# Patient Record
Sex: FEMALE | Race: BLACK OR AFRICAN AMERICAN | NOT HISPANIC OR LATINO | URBAN - METROPOLITAN AREA
[De-identification: names, ages, dates, MRNs, and addresses within clinical notes are randomized per-mention and may not be internally consistent; named-entity substitution may affect disease eponyms.]

---

## 2017-01-01 ENCOUNTER — INPATIENT (INPATIENT)
Facility: HOSPITAL | Age: 72
LOS: 8 days | End: 2017-05-12
Attending: INTERNAL MEDICINE | Admitting: INTERNAL MEDICINE
Payer: MEDICARE

## 2017-01-01 ENCOUNTER — INPATIENT (INPATIENT)
Facility: HOSPITAL | Age: 72
LOS: 8 days | Discharge: NURSING, REHAB OR LONG-TERM CARE | End: 2017-01-13
Attending: INTERNAL MEDICINE | Admitting: INTERNAL MEDICINE
Payer: MEDICARE

## 2017-01-01 VITALS
OXYGEN SATURATION: 99 % | DIASTOLIC BLOOD PRESSURE: 78 MMHG | SYSTOLIC BLOOD PRESSURE: 151 MMHG | RESPIRATION RATE: 16 BRPM | HEART RATE: 100 BPM | TEMPERATURE: 99 F

## 2017-01-01 VITALS
RESPIRATION RATE: 17 BRPM | TEMPERATURE: 99 F | DIASTOLIC BLOOD PRESSURE: 69 MMHG | OXYGEN SATURATION: 92 % | HEART RATE: 138 BPM | SYSTOLIC BLOOD PRESSURE: 129 MMHG

## 2017-01-01 VITALS
WEIGHT: 134.92 LBS | HEART RATE: 99 BPM | DIASTOLIC BLOOD PRESSURE: 60 MMHG | RESPIRATION RATE: 17 BRPM | TEMPERATURE: 98 F | OXYGEN SATURATION: 100 % | HEIGHT: 65 IN | SYSTOLIC BLOOD PRESSURE: 98 MMHG

## 2017-01-01 VITALS
WEIGHT: 110.01 LBS | HEIGHT: 65 IN | HEART RATE: 98 BPM | DIASTOLIC BLOOD PRESSURE: 72 MMHG | RESPIRATION RATE: 18 BRPM | SYSTOLIC BLOOD PRESSURE: 106 MMHG | OXYGEN SATURATION: 98 % | TEMPERATURE: 99 F

## 2017-01-01 DIAGNOSIS — K59.00 CONSTIPATION, UNSPECIFIED: ICD-10-CM

## 2017-01-01 DIAGNOSIS — Z98.890 OTHER SPECIFIED POSTPROCEDURAL STATES: Chronic | ICD-10-CM

## 2017-01-01 DIAGNOSIS — R13.13 DYSPHAGIA, PHARYNGEAL PHASE: ICD-10-CM

## 2017-01-01 DIAGNOSIS — L89.94 PRESSURE ULCER OF UNSPECIFIED SITE, STAGE 4: ICD-10-CM

## 2017-01-01 DIAGNOSIS — T14.8 OTHER INJURY OF UNSPECIFIED BODY REGION: ICD-10-CM

## 2017-01-01 DIAGNOSIS — L89.303 PRESSURE ULCER OF UNSPECIFIED BUTTOCK, STAGE 3: ICD-10-CM

## 2017-01-01 DIAGNOSIS — B20 HUMAN IMMUNODEFICIENCY VIRUS [HIV] DISEASE: ICD-10-CM

## 2017-01-01 DIAGNOSIS — K56.41 FECAL IMPACTION: ICD-10-CM

## 2017-01-01 DIAGNOSIS — R64 CACHEXIA: ICD-10-CM

## 2017-01-01 DIAGNOSIS — N13.30 UNSPECIFIED HYDRONEPHROSIS: ICD-10-CM

## 2017-01-01 DIAGNOSIS — Z98.890 OTHER SPECIFIED POSTPROCEDURAL STATES: ICD-10-CM

## 2017-01-01 DIAGNOSIS — L89.313 PRESSURE ULCER OF RIGHT BUTTOCK, STAGE 3: ICD-10-CM

## 2017-01-01 DIAGNOSIS — L89.321 PRESSURE ULCER OF LEFT BUTTOCK, STAGE 1: ICD-10-CM

## 2017-01-01 DIAGNOSIS — E87.0 HYPEROSMOLALITY AND HYPERNATREMIA: ICD-10-CM

## 2017-01-01 DIAGNOSIS — A41.9 SEPSIS, UNSPECIFIED ORGANISM: ICD-10-CM

## 2017-01-01 DIAGNOSIS — R82.90 UNSPECIFIED ABNORMAL FINDINGS IN URINE: ICD-10-CM

## 2017-01-01 DIAGNOSIS — L89.93 PRESSURE ULCER OF UNSPECIFIED SITE, STAGE 3: ICD-10-CM

## 2017-01-01 DIAGNOSIS — E87.6 HYPOKALEMIA: ICD-10-CM

## 2017-01-01 DIAGNOSIS — J15.6 PNEUMONIA DUE TO OTHER GRAM-NEGATIVE BACTERIA: ICD-10-CM

## 2017-01-01 DIAGNOSIS — R63.6 UNDERWEIGHT: ICD-10-CM

## 2017-01-01 DIAGNOSIS — N39.0 URINARY TRACT INFECTION, SITE NOT SPECIFIED: ICD-10-CM

## 2017-01-01 DIAGNOSIS — Z21 ASYMPTOMATIC HUMAN IMMUNODEFICIENCY VIRUS [HIV] INFECTION STATUS: ICD-10-CM

## 2017-01-01 DIAGNOSIS — L89.154 PRESSURE ULCER OF SACRAL REGION, STAGE 4: ICD-10-CM

## 2017-01-01 DIAGNOSIS — I10 ESSENTIAL (PRIMARY) HYPERTENSION: ICD-10-CM

## 2017-01-01 DIAGNOSIS — I63.9 CEREBRAL INFARCTION, UNSPECIFIED: ICD-10-CM

## 2017-01-01 DIAGNOSIS — E44.0 MODERATE PROTEIN-CALORIE MALNUTRITION: ICD-10-CM

## 2017-01-01 DIAGNOSIS — L89.300 PRESSURE ULCER OF UNSPECIFIED BUTTOCK, UNSTAGEABLE: ICD-10-CM

## 2017-01-01 DIAGNOSIS — Z93.0 TRACHEOSTOMY STATUS: ICD-10-CM

## 2017-01-01 DIAGNOSIS — E43 UNSPECIFIED SEVERE PROTEIN-CALORIE MALNUTRITION: ICD-10-CM

## 2017-01-01 LAB
4/8 RATIO: 4.42 RATIO — HIGH (ref 0.9–3.6)
ABS CD8: 226 /UL — SIGNIFICANT CHANGE UP (ref 136–757)
ALBUMIN SERPL ELPH-MCNC: 1.1 G/DL — LOW (ref 3.3–5)
ALBUMIN SERPL ELPH-MCNC: 1.2 G/DL — LOW (ref 3.3–5)
ALBUMIN SERPL ELPH-MCNC: 1.2 G/DL — LOW (ref 3.3–5)
ALBUMIN SERPL ELPH-MCNC: 1.4 G/DL — LOW (ref 3.3–5)
ALBUMIN SERPL ELPH-MCNC: 2.4 G/DL — LOW (ref 3.3–5)
ALP SERPL-CCNC: 111 U/L — SIGNIFICANT CHANGE UP (ref 40–120)
ALP SERPL-CCNC: 135 U/L — HIGH (ref 40–120)
ALP SERPL-CCNC: 159 U/L — HIGH (ref 40–120)
ALP SERPL-CCNC: 166 U/L — HIGH (ref 40–120)
ALP SERPL-CCNC: 197 U/L — HIGH (ref 40–120)
ALT FLD-CCNC: 10 U/L — LOW (ref 12–78)
ALT FLD-CCNC: 10 U/L — LOW (ref 12–78)
ALT FLD-CCNC: 12 U/L — SIGNIFICANT CHANGE UP (ref 12–78)
ALT FLD-CCNC: 12 U/L — SIGNIFICANT CHANGE UP (ref 12–78)
ALT FLD-CCNC: 14 U/L — SIGNIFICANT CHANGE UP (ref 12–78)
ANION GAP SERPL CALC-SCNC: 10 MMOL/L — SIGNIFICANT CHANGE UP (ref 5–17)
ANION GAP SERPL CALC-SCNC: 10 MMOL/L — SIGNIFICANT CHANGE UP (ref 5–17)
ANION GAP SERPL CALC-SCNC: 11 MMOL/L — SIGNIFICANT CHANGE UP (ref 5–17)
ANION GAP SERPL CALC-SCNC: 13 MMOL/L — SIGNIFICANT CHANGE UP (ref 5–17)
ANION GAP SERPL CALC-SCNC: 6 MMOL/L — SIGNIFICANT CHANGE UP (ref 5–17)
ANION GAP SERPL CALC-SCNC: 6 MMOL/L — SIGNIFICANT CHANGE UP (ref 5–17)
ANION GAP SERPL CALC-SCNC: 7 MMOL/L — SIGNIFICANT CHANGE UP (ref 5–17)
ANION GAP SERPL CALC-SCNC: 7 MMOL/L — SIGNIFICANT CHANGE UP (ref 5–17)
ANION GAP SERPL CALC-SCNC: 8 MMOL/L — SIGNIFICANT CHANGE UP (ref 5–17)
ANION GAP SERPL CALC-SCNC: 9 MMOL/L — SIGNIFICANT CHANGE UP (ref 5–17)
ANISOCYTOSIS BLD QL: SLIGHT — SIGNIFICANT CHANGE UP
ANISOCYTOSIS BLD QL: SLIGHT — SIGNIFICANT CHANGE UP
APPEARANCE UR: CLEAR — SIGNIFICANT CHANGE UP
APPEARANCE UR: CLEAR — SIGNIFICANT CHANGE UP
APTT BLD: 29 SEC — SIGNIFICANT CHANGE UP (ref 27.5–37.4)
APTT BLD: 35 SEC — SIGNIFICANT CHANGE UP (ref 27.5–37.4)
AST SERPL-CCNC: 15 U/L — SIGNIFICANT CHANGE UP (ref 15–37)
AST SERPL-CCNC: 16 U/L — SIGNIFICANT CHANGE UP (ref 15–37)
AST SERPL-CCNC: 19 U/L — SIGNIFICANT CHANGE UP (ref 15–37)
AST SERPL-CCNC: 23 U/L — SIGNIFICANT CHANGE UP (ref 15–37)
AST SERPL-CCNC: 26 U/L — SIGNIFICANT CHANGE UP (ref 15–37)
BACTERIA # UR AUTO: ABNORMAL
BACTERIA # UR AUTO: ABNORMAL
BASOPHILS # BLD AUTO: 0.1 K/UL — SIGNIFICANT CHANGE UP (ref 0–0.2)
BASOPHILS NFR BLD AUTO: 0.3 % — SIGNIFICANT CHANGE UP (ref 0–2)
BASOPHILS NFR BLD AUTO: 0.9 % — SIGNIFICANT CHANGE UP (ref 0–2)
BASOPHILS NFR BLD AUTO: 1 % — SIGNIFICANT CHANGE UP (ref 0–2)
BASOPHILS NFR BLD AUTO: 1 % — SIGNIFICANT CHANGE UP (ref 0–2)
BILIRUB SERPL-MCNC: 0.4 MG/DL — SIGNIFICANT CHANGE UP (ref 0.2–1.2)
BILIRUB SERPL-MCNC: 0.5 MG/DL — SIGNIFICANT CHANGE UP (ref 0.2–1.2)
BILIRUB SERPL-MCNC: 0.5 MG/DL — SIGNIFICANT CHANGE UP (ref 0.2–1.2)
BILIRUB UR-MCNC: NEGATIVE — SIGNIFICANT CHANGE UP
BILIRUB UR-MCNC: NEGATIVE — SIGNIFICANT CHANGE UP
BUN SERPL-MCNC: 10 MG/DL — SIGNIFICANT CHANGE UP (ref 7–23)
BUN SERPL-MCNC: 10 MG/DL — SIGNIFICANT CHANGE UP (ref 7–23)
BUN SERPL-MCNC: 11 MG/DL — SIGNIFICANT CHANGE UP (ref 7–23)
BUN SERPL-MCNC: 12 MG/DL — SIGNIFICANT CHANGE UP (ref 7–23)
BUN SERPL-MCNC: 12 MG/DL — SIGNIFICANT CHANGE UP (ref 7–23)
BUN SERPL-MCNC: 13 MG/DL — SIGNIFICANT CHANGE UP (ref 7–23)
BUN SERPL-MCNC: 15 MG/DL — SIGNIFICANT CHANGE UP (ref 7–23)
BUN SERPL-MCNC: 3 MG/DL — LOW (ref 7–23)
BUN SERPL-MCNC: 4 MG/DL — LOW (ref 7–23)
BUN SERPL-MCNC: 5 MG/DL — LOW (ref 7–23)
BUN SERPL-MCNC: 5 MG/DL — LOW (ref 7–23)
BUN SERPL-MCNC: 7 MG/DL — SIGNIFICANT CHANGE UP (ref 7–23)
BUN SERPL-MCNC: 9 MG/DL — SIGNIFICANT CHANGE UP (ref 7–23)
BURR CELLS BLD QL SMEAR: PRESENT — SIGNIFICANT CHANGE UP
CALCIUM SERPL-MCNC: 7.6 MG/DL — LOW (ref 8.5–10.1)
CALCIUM SERPL-MCNC: 7.6 MG/DL — LOW (ref 8.5–10.1)
CALCIUM SERPL-MCNC: 7.7 MG/DL — LOW (ref 8.5–10.1)
CALCIUM SERPL-MCNC: 7.7 MG/DL — LOW (ref 8.5–10.1)
CALCIUM SERPL-MCNC: 7.8 MG/DL — LOW (ref 8.5–10.1)
CALCIUM SERPL-MCNC: 7.9 MG/DL — LOW (ref 8.5–10.1)
CALCIUM SERPL-MCNC: 8.1 MG/DL — LOW (ref 8.5–10.1)
CALCIUM SERPL-MCNC: 8.3 MG/DL — LOW (ref 8.5–10.1)
CALCIUM SERPL-MCNC: 8.3 MG/DL — LOW (ref 8.5–10.1)
CALCIUM SERPL-MCNC: 8.4 MG/DL — LOW (ref 8.5–10.1)
CALCIUM SERPL-MCNC: 8.4 MG/DL — LOW (ref 8.5–10.1)
CALCIUM SERPL-MCNC: 8.6 MG/DL — SIGNIFICANT CHANGE UP (ref 8.5–10.1)
CALCIUM SERPL-MCNC: 8.7 MG/DL — SIGNIFICANT CHANGE UP (ref 8.5–10.1)
CALCIUM SERPL-MCNC: 8.8 MG/DL — SIGNIFICANT CHANGE UP (ref 8.5–10.1)
CALCIUM SERPL-MCNC: 8.8 MG/DL — SIGNIFICANT CHANGE UP (ref 8.5–10.1)
CALCIUM SERPL-MCNC: 9 MG/DL — SIGNIFICANT CHANGE UP (ref 8.5–10.1)
CALCIUM SERPL-MCNC: 9.4 MG/DL — SIGNIFICANT CHANGE UP (ref 8.5–10.1)
CD16+CD56+ CELLS NFR BLD: 20 % — SIGNIFICANT CHANGE UP (ref 4–25)
CD16+CD56+ CELLS NFR SPEC: 386 /UL — SIGNIFICANT CHANGE UP (ref 64–494)
CD19 BLASTS SPEC-ACNC: 15 % — SIGNIFICANT CHANGE UP (ref 5–22)
CD19 BLASTS SPEC-ACNC: 284 /UL — SIGNIFICANT CHANGE UP (ref 68–528)
CD3 BLASTS SPEC-ACNC: 1233 /UL — SIGNIFICANT CHANGE UP (ref 799–2171)
CD3 BLASTS SPEC-ACNC: 65 % — SIGNIFICANT CHANGE UP (ref 59–85)
CD4 %: 53 % — SIGNIFICANT CHANGE UP (ref 36–65)
CD8 %: 12 % — SIGNIFICANT CHANGE UP (ref 11–36)
CHLORIDE SERPL-SCNC: 100 MMOL/L — SIGNIFICANT CHANGE UP (ref 96–108)
CHLORIDE SERPL-SCNC: 101 MMOL/L — SIGNIFICANT CHANGE UP (ref 96–108)
CHLORIDE SERPL-SCNC: 103 MMOL/L — SIGNIFICANT CHANGE UP (ref 96–108)
CHLORIDE SERPL-SCNC: 104 MMOL/L — SIGNIFICANT CHANGE UP (ref 96–108)
CHLORIDE SERPL-SCNC: 106 MMOL/L — SIGNIFICANT CHANGE UP (ref 96–108)
CHLORIDE SERPL-SCNC: 107 MMOL/L — SIGNIFICANT CHANGE UP (ref 96–108)
CHLORIDE SERPL-SCNC: 107 MMOL/L — SIGNIFICANT CHANGE UP (ref 96–108)
CHLORIDE SERPL-SCNC: 108 MMOL/L — SIGNIFICANT CHANGE UP (ref 96–108)
CHLORIDE SERPL-SCNC: 109 MMOL/L — HIGH (ref 96–108)
CHLORIDE SERPL-SCNC: 110 MMOL/L — HIGH (ref 96–108)
CHLORIDE SERPL-SCNC: 112 MMOL/L — HIGH (ref 96–108)
CHLORIDE SERPL-SCNC: 113 MMOL/L — HIGH (ref 96–108)
CHLORIDE SERPL-SCNC: 113 MMOL/L — HIGH (ref 96–108)
CHLORIDE SERPL-SCNC: 117 MMOL/L — HIGH (ref 96–108)
CHLORIDE SERPL-SCNC: 119 MMOL/L — HIGH (ref 96–108)
CO2 SERPL-SCNC: 20 MMOL/L — LOW (ref 22–31)
CO2 SERPL-SCNC: 21 MMOL/L — LOW (ref 22–31)
CO2 SERPL-SCNC: 22 MMOL/L — SIGNIFICANT CHANGE UP (ref 22–31)
CO2 SERPL-SCNC: 24 MMOL/L — SIGNIFICANT CHANGE UP (ref 22–31)
CO2 SERPL-SCNC: 25 MMOL/L — SIGNIFICANT CHANGE UP (ref 22–31)
CO2 SERPL-SCNC: 26 MMOL/L — SIGNIFICANT CHANGE UP (ref 22–31)
CO2 SERPL-SCNC: 27 MMOL/L — SIGNIFICANT CHANGE UP (ref 22–31)
CO2 SERPL-SCNC: 27 MMOL/L — SIGNIFICANT CHANGE UP (ref 22–31)
CO2 SERPL-SCNC: 29 MMOL/L — SIGNIFICANT CHANGE UP (ref 22–31)
CO2 SERPL-SCNC: 30 MMOL/L — SIGNIFICANT CHANGE UP (ref 22–31)
CO2 SERPL-SCNC: 30 MMOL/L — SIGNIFICANT CHANGE UP (ref 22–31)
CO2 SERPL-SCNC: 31 MMOL/L — SIGNIFICANT CHANGE UP (ref 22–31)
CO2 SERPL-SCNC: 32 MMOL/L — HIGH (ref 22–31)
CO2 SERPL-SCNC: 33 MMOL/L — HIGH (ref 22–31)
CO2 SERPL-SCNC: 34 MMOL/L — HIGH (ref 22–31)
COLOR SPEC: YELLOW — SIGNIFICANT CHANGE UP
COLOR SPEC: YELLOW — SIGNIFICANT CHANGE UP
COMMENT - URINE: SIGNIFICANT CHANGE UP
CREAT SERPL-MCNC: 0.33 MG/DL — LOW (ref 0.5–1.3)
CREAT SERPL-MCNC: 0.4 MG/DL — LOW (ref 0.5–1.3)
CREAT SERPL-MCNC: 0.4 MG/DL — LOW (ref 0.5–1.3)
CREAT SERPL-MCNC: 0.43 MG/DL — LOW (ref 0.5–1.3)
CREAT SERPL-MCNC: 0.44 MG/DL — LOW (ref 0.5–1.3)
CREAT SERPL-MCNC: 0.45 MG/DL — LOW (ref 0.5–1.3)
CREAT SERPL-MCNC: 0.45 MG/DL — LOW (ref 0.5–1.3)
CREAT SERPL-MCNC: 0.46 MG/DL — LOW (ref 0.5–1.3)
CREAT SERPL-MCNC: 0.5 MG/DL — SIGNIFICANT CHANGE UP (ref 0.5–1.3)
CREAT SERPL-MCNC: 0.51 MG/DL — SIGNIFICANT CHANGE UP (ref 0.5–1.3)
CREAT SERPL-MCNC: 0.56 MG/DL — SIGNIFICANT CHANGE UP (ref 0.5–1.3)
CREAT SERPL-MCNC: 0.59 MG/DL — SIGNIFICANT CHANGE UP (ref 0.5–1.3)
CREAT SERPL-MCNC: 0.87 MG/DL — SIGNIFICANT CHANGE UP (ref 0.5–1.3)
CREAT SERPL-MCNC: 1.01 MG/DL — SIGNIFICANT CHANGE UP (ref 0.5–1.3)
CREAT SERPL-MCNC: 1.17 MG/DL — SIGNIFICANT CHANGE UP (ref 0.5–1.3)
CREAT SERPL-MCNC: 1.18 MG/DL — SIGNIFICANT CHANGE UP (ref 0.5–1.3)
CREAT SERPL-MCNC: 1.2 MG/DL — SIGNIFICANT CHANGE UP (ref 0.5–1.3)
CREAT SERPL-MCNC: 1.3 MG/DL — SIGNIFICANT CHANGE UP (ref 0.5–1.3)
CREAT SERPL-MCNC: 1.33 MG/DL — HIGH (ref 0.5–1.3)
CRP SERPL-MCNC: 9.76 MG/DL — HIGH (ref 0–0.4)
CULTURE RESULTS: NO GROWTH — SIGNIFICANT CHANGE UP
CULTURE RESULTS: SIGNIFICANT CHANGE UP
DIFF PNL FLD: ABNORMAL
DIFF PNL FLD: ABNORMAL
ELLIPTOCYTES BLD QL SMEAR: SLIGHT — SIGNIFICANT CHANGE UP
EOSINOPHIL # BLD AUTO: 0 K/UL — SIGNIFICANT CHANGE UP (ref 0–0.5)
EOSINOPHIL # BLD AUTO: 0 K/UL — SIGNIFICANT CHANGE UP (ref 0–0.5)
EOSINOPHIL # BLD AUTO: 0.1 K/UL — SIGNIFICANT CHANGE UP (ref 0–0.5)
EOSINOPHIL NFR BLD AUTO: 0 % — SIGNIFICANT CHANGE UP (ref 0–6)
EOSINOPHIL NFR BLD AUTO: 0.2 % — SIGNIFICANT CHANGE UP (ref 0–6)
EOSINOPHIL NFR BLD AUTO: 1 % — SIGNIFICANT CHANGE UP (ref 0–6)
EOSINOPHIL NFR BLD AUTO: 1.1 % — SIGNIFICANT CHANGE UP (ref 0–6)
EPI CELLS # UR: ABNORMAL
EPI CELLS # UR: SIGNIFICANT CHANGE UP
ERYTHROCYTE [SEDIMENTATION RATE] IN BLOOD: 44 MM/HR — HIGH (ref 0–20)
ERYTHROCYTE [SEDIMENTATION RATE] IN BLOOD: 50 MM/HR — HIGH (ref 0–20)
GLUCOSE SERPL-MCNC: 102 MG/DL — HIGH (ref 70–99)
GLUCOSE SERPL-MCNC: 104 MG/DL — HIGH (ref 70–99)
GLUCOSE SERPL-MCNC: 105 MG/DL — HIGH (ref 70–99)
GLUCOSE SERPL-MCNC: 105 MG/DL — HIGH (ref 70–99)
GLUCOSE SERPL-MCNC: 107 MG/DL — HIGH (ref 70–99)
GLUCOSE SERPL-MCNC: 110 MG/DL — HIGH (ref 70–99)
GLUCOSE SERPL-MCNC: 112 MG/DL — HIGH (ref 70–99)
GLUCOSE SERPL-MCNC: 117 MG/DL — HIGH (ref 70–99)
GLUCOSE SERPL-MCNC: 120 MG/DL — HIGH (ref 70–99)
GLUCOSE SERPL-MCNC: 124 MG/DL — HIGH (ref 70–99)
GLUCOSE SERPL-MCNC: 131 MG/DL — HIGH (ref 70–99)
GLUCOSE SERPL-MCNC: 142 MG/DL — HIGH (ref 70–99)
GLUCOSE SERPL-MCNC: 151 MG/DL — HIGH (ref 70–99)
GLUCOSE SERPL-MCNC: 78 MG/DL — SIGNIFICANT CHANGE UP (ref 70–99)
GLUCOSE SERPL-MCNC: 85 MG/DL — SIGNIFICANT CHANGE UP (ref 70–99)
GLUCOSE SERPL-MCNC: 92 MG/DL — SIGNIFICANT CHANGE UP (ref 70–99)
GLUCOSE SERPL-MCNC: 95 MG/DL — SIGNIFICANT CHANGE UP (ref 70–99)
GLUCOSE UR QL: NEGATIVE MG/DL — SIGNIFICANT CHANGE UP
GLUCOSE UR QL: NEGATIVE MG/DL — SIGNIFICANT CHANGE UP
HCT VFR BLD CALC: 24.8 % — LOW (ref 34.5–45)
HCT VFR BLD CALC: 25.8 % — LOW (ref 34.5–45)
HCT VFR BLD CALC: 26.8 % — LOW (ref 34.5–45)
HCT VFR BLD CALC: 27 % — LOW (ref 34.5–45)
HCT VFR BLD CALC: 27.2 % — LOW (ref 34.5–45)
HCT VFR BLD CALC: 27.4 % — LOW (ref 34.5–45)
HCT VFR BLD CALC: 28.1 % — LOW (ref 34.5–45)
HCT VFR BLD CALC: 28.2 % — LOW (ref 34.5–45)
HCT VFR BLD CALC: 28.6 % — LOW (ref 34.5–45)
HCT VFR BLD CALC: 29.2 % — LOW (ref 34.5–45)
HCT VFR BLD CALC: 29.9 % — LOW (ref 34.5–45)
HCT VFR BLD CALC: 31.4 % — LOW (ref 34.5–45)
HCT VFR BLD CALC: 32.3 % — LOW (ref 34.5–45)
HGB BLD-MCNC: 10 G/DL — LOW (ref 11.5–15.5)
HGB BLD-MCNC: 11.3 G/DL — LOW (ref 11.5–15.5)
HGB BLD-MCNC: 8.4 G/DL — LOW (ref 11.5–15.5)
HGB BLD-MCNC: 8.5 G/DL — LOW (ref 11.5–15.5)
HGB BLD-MCNC: 8.7 G/DL — LOW (ref 11.5–15.5)
HGB BLD-MCNC: 8.8 G/DL — LOW (ref 11.5–15.5)
HGB BLD-MCNC: 8.8 G/DL — LOW (ref 11.5–15.5)
HGB BLD-MCNC: 9.1 G/DL — LOW (ref 11.5–15.5)
HGB BLD-MCNC: 9.2 G/DL — LOW (ref 11.5–15.5)
HGB BLD-MCNC: 9.3 G/DL — LOW (ref 11.5–15.5)
HGB BLD-MCNC: 9.4 G/DL — LOW (ref 11.5–15.5)
HGB BLD-MCNC: 9.5 G/DL — LOW (ref 11.5–15.5)
HGB BLD-MCNC: 9.9 G/DL — LOW (ref 11.5–15.5)
HIV 1+2 AB+HIV1 P24 AG SERPL QL IA: SIGNIFICANT CHANGE UP
HIV1 RNA # SERPL NAA+PROBE: SIGNIFICANT CHANGE UP
HIV1 RNA SERPL NAA+PROBE-LOG#: SIGNIFICANT CHANGE UP LG10COP/ML
HYALINE CASTS # UR AUTO: ABNORMAL /LPF
HYPOCHROMIA BLD QL: SLIGHT — SIGNIFICANT CHANGE UP
HYPOCHROMIA BLD QL: SLIGHT — SIGNIFICANT CHANGE UP
INR BLD: 1.19 RATIO — HIGH (ref 0.88–1.16)
INR BLD: 1.23 RATIO — HIGH (ref 0.88–1.16)
KETONES UR-MCNC: ABNORMAL
KETONES UR-MCNC: ABNORMAL
LACTATE SERPL-SCNC: 1 MMOL/L — SIGNIFICANT CHANGE UP (ref 0.7–2)
LACTATE SERPL-SCNC: 1.7 MMOL/L — SIGNIFICANT CHANGE UP (ref 0.7–2)
LACTATE SERPL-SCNC: 2.1 MMOL/L — HIGH (ref 0.7–2)
LACTATE SERPL-SCNC: 2.2 MMOL/L — HIGH (ref 0.7–2)
LACTATE SERPL-SCNC: 2.8 MMOL/L — HIGH (ref 0.7–2)
LACTATE SERPL-SCNC: 3.2 MMOL/L — HIGH (ref 0.7–2)
LEUKOCYTE ESTERASE UR-ACNC: ABNORMAL
LEUKOCYTE ESTERASE UR-ACNC: ABNORMAL
LG PLATELETS BLD QL AUTO: SIGNIFICANT CHANGE UP
LIDOCAIN IGE QN: 70 U/L — LOW (ref 73–393)
LYMPHOCYTES # BLD AUTO: 0.8 K/UL — LOW (ref 1–3.3)
LYMPHOCYTES # BLD AUTO: 10 % — LOW (ref 13–44)
LYMPHOCYTES # BLD AUTO: 2.1 K/UL — SIGNIFICANT CHANGE UP (ref 1–3.3)
LYMPHOCYTES # BLD AUTO: 2.8 K/UL — SIGNIFICANT CHANGE UP (ref 1–3.3)
LYMPHOCYTES # BLD AUTO: 24.6 % — SIGNIFICANT CHANGE UP (ref 13–44)
LYMPHOCYTES # BLD AUTO: 28.9 % — SIGNIFICANT CHANGE UP (ref 13–44)
LYMPHOCYTES # BLD AUTO: 29 % — SIGNIFICANT CHANGE UP (ref 13–44)
LYMPHOCYTES # BLD AUTO: 4.4 % — LOW (ref 13–44)
MACROCYTES BLD QL: SLIGHT — SIGNIFICANT CHANGE UP
MAGNESIUM SERPL-MCNC: 1.8 MG/DL — SIGNIFICANT CHANGE UP (ref 1.8–2.4)
MAGNESIUM SERPL-MCNC: 1.9 MG/DL — SIGNIFICANT CHANGE UP (ref 1.8–2.4)
MAGNESIUM SERPL-MCNC: 2.1 MG/DL — SIGNIFICANT CHANGE UP (ref 1.8–2.4)
MCHC RBC-ENTMCNC: 27.4 PG — SIGNIFICANT CHANGE UP (ref 27–34)
MCHC RBC-ENTMCNC: 27.6 PG — SIGNIFICANT CHANGE UP (ref 27–34)
MCHC RBC-ENTMCNC: 28.2 PG — SIGNIFICANT CHANGE UP (ref 27–34)
MCHC RBC-ENTMCNC: 28.2 PG — SIGNIFICANT CHANGE UP (ref 27–34)
MCHC RBC-ENTMCNC: 28.6 PG — SIGNIFICANT CHANGE UP (ref 27–34)
MCHC RBC-ENTMCNC: 28.9 PG — SIGNIFICANT CHANGE UP (ref 27–34)
MCHC RBC-ENTMCNC: 28.9 PG — SIGNIFICANT CHANGE UP (ref 27–34)
MCHC RBC-ENTMCNC: 29 PG — SIGNIFICANT CHANGE UP (ref 27–34)
MCHC RBC-ENTMCNC: 29.1 PG — SIGNIFICANT CHANGE UP (ref 27–34)
MCHC RBC-ENTMCNC: 29.1 PG — SIGNIFICANT CHANGE UP (ref 27–34)
MCHC RBC-ENTMCNC: 29.5 PG — SIGNIFICANT CHANGE UP (ref 27–34)
MCHC RBC-ENTMCNC: 29.7 PG — SIGNIFICANT CHANGE UP (ref 27–34)
MCHC RBC-ENTMCNC: 30.1 PG — SIGNIFICANT CHANGE UP (ref 27–34)
MCHC RBC-ENTMCNC: 30.1 PG — SIGNIFICANT CHANGE UP (ref 27–34)
MCHC RBC-ENTMCNC: 30.7 PG — SIGNIFICANT CHANGE UP (ref 27–34)
MCHC RBC-ENTMCNC: 31.7 GM/DL — LOW (ref 32–36)
MCHC RBC-ENTMCNC: 32.1 GM/DL — SIGNIFICANT CHANGE UP (ref 32–36)
MCHC RBC-ENTMCNC: 32.6 GM/DL — SIGNIFICANT CHANGE UP (ref 32–36)
MCHC RBC-ENTMCNC: 32.7 GM/DL — SIGNIFICANT CHANGE UP (ref 32–36)
MCHC RBC-ENTMCNC: 33 GM/DL — SIGNIFICANT CHANGE UP (ref 32–36)
MCHC RBC-ENTMCNC: 33 GM/DL — SIGNIFICANT CHANGE UP (ref 32–36)
MCHC RBC-ENTMCNC: 33.6 GM/DL — SIGNIFICANT CHANGE UP (ref 32–36)
MCHC RBC-ENTMCNC: 33.6 GM/DL — SIGNIFICANT CHANGE UP (ref 32–36)
MCHC RBC-ENTMCNC: 33.7 GM/DL — SIGNIFICANT CHANGE UP (ref 32–36)
MCHC RBC-ENTMCNC: 33.7 GM/DL — SIGNIFICANT CHANGE UP (ref 32–36)
MCHC RBC-ENTMCNC: 34 GM/DL — SIGNIFICANT CHANGE UP (ref 32–36)
MCHC RBC-ENTMCNC: 34.1 GM/DL — SIGNIFICANT CHANGE UP (ref 32–36)
MCHC RBC-ENTMCNC: 34.2 GM/DL — SIGNIFICANT CHANGE UP (ref 32–36)
MCHC RBC-ENTMCNC: 34.6 GM/DL — SIGNIFICANT CHANGE UP (ref 32–36)
MCHC RBC-ENTMCNC: 35.1 GM/DL — SIGNIFICANT CHANGE UP (ref 32–36)
MCV RBC AUTO: 84.7 FL — SIGNIFICANT CHANGE UP (ref 80–100)
MCV RBC AUTO: 85.5 FL — SIGNIFICANT CHANGE UP (ref 80–100)
MCV RBC AUTO: 86.1 FL — SIGNIFICANT CHANGE UP (ref 80–100)
MCV RBC AUTO: 86.1 FL — SIGNIFICANT CHANGE UP (ref 80–100)
MCV RBC AUTO: 86.2 FL — SIGNIFICANT CHANGE UP (ref 80–100)
MCV RBC AUTO: 86.3 FL — SIGNIFICANT CHANGE UP (ref 80–100)
MCV RBC AUTO: 86.5 FL — SIGNIFICANT CHANGE UP (ref 80–100)
MCV RBC AUTO: 86.5 FL — SIGNIFICANT CHANGE UP (ref 80–100)
MCV RBC AUTO: 86.6 FL — SIGNIFICANT CHANGE UP (ref 80–100)
MCV RBC AUTO: 86.8 FL — SIGNIFICANT CHANGE UP (ref 80–100)
MCV RBC AUTO: 87 FL — SIGNIFICANT CHANGE UP (ref 80–100)
MCV RBC AUTO: 87.6 FL — SIGNIFICANT CHANGE UP (ref 80–100)
MCV RBC AUTO: 87.8 FL — SIGNIFICANT CHANGE UP (ref 80–100)
MCV RBC AUTO: 88.4 FL — SIGNIFICANT CHANGE UP (ref 80–100)
MCV RBC AUTO: 89.9 FL — SIGNIFICANT CHANGE UP (ref 80–100)
MICROCYTES BLD QL: SLIGHT — SIGNIFICANT CHANGE UP
MICROCYTES BLD QL: SLIGHT — SIGNIFICANT CHANGE UP
MONOCYTES # BLD AUTO: 0.5 K/UL — SIGNIFICANT CHANGE UP (ref 0–0.9)
MONOCYTES # BLD AUTO: 0.7 K/UL — SIGNIFICANT CHANGE UP (ref 0–0.9)
MONOCYTES # BLD AUTO: 0.9 K/UL — SIGNIFICANT CHANGE UP (ref 0–0.9)
MONOCYTES NFR BLD AUTO: 1 % — LOW (ref 2–14)
MONOCYTES NFR BLD AUTO: 4.9 % — SIGNIFICANT CHANGE UP (ref 2–14)
MONOCYTES NFR BLD AUTO: 5.8 % — SIGNIFICANT CHANGE UP (ref 2–14)
MONOCYTES NFR BLD AUTO: 7.1 % — SIGNIFICANT CHANGE UP (ref 2–14)
MONOCYTES NFR BLD AUTO: 8 % — SIGNIFICANT CHANGE UP (ref 2–14)
NEUTROPHILS # BLD AUTO: 16 K/UL — HIGH (ref 1.8–7.4)
NEUTROPHILS # BLD AUTO: 6 K/UL — SIGNIFICANT CHANGE UP (ref 1.8–7.4)
NEUTROPHILS # BLD AUTO: 6 K/UL — SIGNIFICANT CHANGE UP (ref 1.8–7.4)
NEUTROPHILS NFR BLD AUTO: 61 % — SIGNIFICANT CHANGE UP (ref 43–77)
NEUTROPHILS NFR BLD AUTO: 61.9 % — SIGNIFICANT CHANGE UP (ref 43–77)
NEUTROPHILS NFR BLD AUTO: 68.4 % — SIGNIFICANT CHANGE UP (ref 43–77)
NEUTROPHILS NFR BLD AUTO: 89 % — HIGH (ref 43–77)
NEUTROPHILS NFR BLD AUTO: 90.4 % — HIGH (ref 43–77)
NITRITE UR-MCNC: NEGATIVE — SIGNIFICANT CHANGE UP
NITRITE UR-MCNC: POSITIVE
PH UR: 5 — SIGNIFICANT CHANGE UP (ref 5–8)
PH UR: 6 — SIGNIFICANT CHANGE UP (ref 4.8–8)
PHOSPHATE SERPL-MCNC: 1.9 MG/DL — LOW (ref 2.5–4.5)
PHOSPHATE SERPL-MCNC: 2.8 MG/DL — SIGNIFICANT CHANGE UP (ref 2.5–4.5)
PLAT MORPH BLD: ABNORMAL
PLAT MORPH BLD: NORMAL — SIGNIFICANT CHANGE UP
PLATELET # BLD AUTO: 307 K/UL — SIGNIFICANT CHANGE UP (ref 150–400)
PLATELET # BLD AUTO: 316 K/UL — SIGNIFICANT CHANGE UP (ref 150–400)
PLATELET # BLD AUTO: 323 K/UL — SIGNIFICANT CHANGE UP (ref 150–400)
PLATELET # BLD AUTO: 324 K/UL — SIGNIFICANT CHANGE UP (ref 150–400)
PLATELET # BLD AUTO: 343 K/UL — SIGNIFICANT CHANGE UP (ref 150–400)
PLATELET # BLD AUTO: 359 K/UL — SIGNIFICANT CHANGE UP (ref 150–400)
PLATELET # BLD AUTO: 392 K/UL — SIGNIFICANT CHANGE UP (ref 150–400)
PLATELET # BLD AUTO: 414 K/UL — HIGH (ref 150–400)
PLATELET # BLD AUTO: 439 K/UL — HIGH (ref 150–400)
PLATELET # BLD AUTO: 458 K/UL — HIGH (ref 150–400)
PLATELET # BLD AUTO: 460 K/UL — HIGH (ref 150–400)
PLATELET # BLD AUTO: 483 K/UL — HIGH (ref 150–400)
PLATELET # BLD AUTO: 510 K/UL — HIGH (ref 150–400)
PLATELET # BLD AUTO: 513 K/UL — HIGH (ref 150–400)
PLATELET # BLD AUTO: 513 K/UL — HIGH (ref 150–400)
POTASSIUM SERPL-MCNC: 2.5 MMOL/L — CRITICAL LOW (ref 3.5–5.3)
POTASSIUM SERPL-MCNC: 2.6 MMOL/L — CRITICAL LOW (ref 3.5–5.3)
POTASSIUM SERPL-MCNC: 2.9 MMOL/L — CRITICAL LOW (ref 3.5–5.3)
POTASSIUM SERPL-MCNC: 3 MMOL/L — LOW (ref 3.5–5.3)
POTASSIUM SERPL-MCNC: 3 MMOL/L — LOW (ref 3.5–5.3)
POTASSIUM SERPL-MCNC: 3.1 MMOL/L — LOW (ref 3.5–5.3)
POTASSIUM SERPL-MCNC: 3.3 MMOL/L — LOW (ref 3.5–5.3)
POTASSIUM SERPL-MCNC: 3.4 MMOL/L — LOW (ref 3.5–5.3)
POTASSIUM SERPL-MCNC: 3.6 MMOL/L — SIGNIFICANT CHANGE UP (ref 3.5–5.3)
POTASSIUM SERPL-MCNC: 3.6 MMOL/L — SIGNIFICANT CHANGE UP (ref 3.5–5.3)
POTASSIUM SERPL-MCNC: 3.7 MMOL/L — SIGNIFICANT CHANGE UP (ref 3.5–5.3)
POTASSIUM SERPL-MCNC: 3.8 MMOL/L — SIGNIFICANT CHANGE UP (ref 3.5–5.3)
POTASSIUM SERPL-MCNC: 3.9 MMOL/L — SIGNIFICANT CHANGE UP (ref 3.5–5.3)
POTASSIUM SERPL-MCNC: 3.9 MMOL/L — SIGNIFICANT CHANGE UP (ref 3.5–5.3)
POTASSIUM SERPL-MCNC: 4 MMOL/L — SIGNIFICANT CHANGE UP (ref 3.5–5.3)
POTASSIUM SERPL-SCNC: 2.5 MMOL/L — CRITICAL LOW (ref 3.5–5.3)
POTASSIUM SERPL-SCNC: 2.6 MMOL/L — CRITICAL LOW (ref 3.5–5.3)
POTASSIUM SERPL-SCNC: 2.9 MMOL/L — CRITICAL LOW (ref 3.5–5.3)
POTASSIUM SERPL-SCNC: 3 MMOL/L — LOW (ref 3.5–5.3)
POTASSIUM SERPL-SCNC: 3 MMOL/L — LOW (ref 3.5–5.3)
POTASSIUM SERPL-SCNC: 3.1 MMOL/L — LOW (ref 3.5–5.3)
POTASSIUM SERPL-SCNC: 3.3 MMOL/L — LOW (ref 3.5–5.3)
POTASSIUM SERPL-SCNC: 3.4 MMOL/L — LOW (ref 3.5–5.3)
POTASSIUM SERPL-SCNC: 3.6 MMOL/L — SIGNIFICANT CHANGE UP (ref 3.5–5.3)
POTASSIUM SERPL-SCNC: 3.6 MMOL/L — SIGNIFICANT CHANGE UP (ref 3.5–5.3)
POTASSIUM SERPL-SCNC: 3.7 MMOL/L — SIGNIFICANT CHANGE UP (ref 3.5–5.3)
POTASSIUM SERPL-SCNC: 3.8 MMOL/L — SIGNIFICANT CHANGE UP (ref 3.5–5.3)
POTASSIUM SERPL-SCNC: 3.9 MMOL/L — SIGNIFICANT CHANGE UP (ref 3.5–5.3)
POTASSIUM SERPL-SCNC: 3.9 MMOL/L — SIGNIFICANT CHANGE UP (ref 3.5–5.3)
POTASSIUM SERPL-SCNC: 4 MMOL/L — SIGNIFICANT CHANGE UP (ref 3.5–5.3)
PROCALCITONIN SERPL-MCNC: 0.96 NG/ML — HIGH (ref 0–0.05)
PROCALCITONIN SERPL-MCNC: 34.27 NG/ML — HIGH (ref 0–0.05)
PROT SERPL-MCNC: 5.8 GM/DL — LOW (ref 6–8.3)
PROT SERPL-MCNC: 5.8 GM/DL — LOW (ref 6–8.3)
PROT SERPL-MCNC: 5.9 GM/DL — LOW (ref 6–8.3)
PROT SERPL-MCNC: 6.8 GM/DL — SIGNIFICANT CHANGE UP (ref 6–8.3)
PROT SERPL-MCNC: 8 GM/DL — SIGNIFICANT CHANGE UP (ref 6–8.3)
PROT UR-MCNC: 100 MG/DL
PROT UR-MCNC: 30 MG/DL
PROTHROM AB SERPL-ACNC: 13 SEC — HIGH (ref 9.8–12.7)
PROTHROM AB SERPL-ACNC: 13.8 SEC — HIGH (ref 10–13.1)
RBC # BLD: 2.89 M/UL — LOW (ref 3.8–5.2)
RBC # BLD: 2.97 M/UL — LOW (ref 3.8–5.2)
RBC # BLD: 3.1 M/UL — LOW (ref 3.8–5.2)
RBC # BLD: 3.12 M/UL — LOW (ref 3.8–5.2)
RBC # BLD: 3.16 M/UL — LOW (ref 3.8–5.2)
RBC # BLD: 3.16 M/UL — LOW (ref 3.8–5.2)
RBC # BLD: 3.18 M/UL — LOW (ref 3.8–5.2)
RBC # BLD: 3.2 M/UL — LOW (ref 3.8–5.2)
RBC # BLD: 3.21 M/UL — LOW (ref 3.8–5.2)
RBC # BLD: 3.28 M/UL — LOW (ref 3.8–5.2)
RBC # BLD: 3.29 M/UL — LOW (ref 3.8–5.2)
RBC # BLD: 3.31 M/UL — LOW (ref 3.8–5.2)
RBC # BLD: 3.32 M/UL — LOW (ref 3.8–5.2)
RBC # BLD: 3.62 M/UL — LOW (ref 3.8–5.2)
RBC # BLD: 3.69 M/UL — LOW (ref 3.8–5.2)
RBC # FLD: 12 % — SIGNIFICANT CHANGE UP (ref 11–15)
RBC # FLD: 12 % — SIGNIFICANT CHANGE UP (ref 11–15)
RBC # FLD: 12.1 % — SIGNIFICANT CHANGE UP (ref 11–15)
RBC # FLD: 12.7 % — SIGNIFICANT CHANGE UP (ref 11–15)
RBC # FLD: 12.9 % — SIGNIFICANT CHANGE UP (ref 11–15)
RBC # FLD: 12.9 % — SIGNIFICANT CHANGE UP (ref 11–15)
RBC # FLD: 13.3 % — SIGNIFICANT CHANGE UP (ref 11–15)
RBC # FLD: 13.4 % — SIGNIFICANT CHANGE UP (ref 11–15)
RBC # FLD: 13.4 % — SIGNIFICANT CHANGE UP (ref 11–15)
RBC # FLD: 13.9 % — SIGNIFICANT CHANGE UP (ref 11–15)
RBC # FLD: 13.9 % — SIGNIFICANT CHANGE UP (ref 11–15)
RBC # FLD: 14 % — SIGNIFICANT CHANGE UP (ref 11–15)
RBC # FLD: 14.2 % — SIGNIFICANT CHANGE UP (ref 11–15)
RBC # FLD: 14.3 % — SIGNIFICANT CHANGE UP (ref 11–15)
RBC # FLD: 14.5 % — SIGNIFICANT CHANGE UP (ref 11–15)
RBC BLD AUTO: ABNORMAL
RBC BLD AUTO: SIGNIFICANT CHANGE UP
RBC CASTS # UR COMP ASSIST: ABNORMAL /HPF (ref 0–4)
SCHISTOCYTES BLD QL AUTO: SLIGHT — SIGNIFICANT CHANGE UP
SODIUM SERPL-SCNC: 138 MMOL/L — SIGNIFICANT CHANGE UP (ref 135–145)
SODIUM SERPL-SCNC: 139 MMOL/L — SIGNIFICANT CHANGE UP (ref 135–145)
SODIUM SERPL-SCNC: 139 MMOL/L — SIGNIFICANT CHANGE UP (ref 135–145)
SODIUM SERPL-SCNC: 140 MMOL/L — SIGNIFICANT CHANGE UP (ref 135–145)
SODIUM SERPL-SCNC: 144 MMOL/L — SIGNIFICANT CHANGE UP (ref 135–145)
SODIUM SERPL-SCNC: 145 MMOL/L — SIGNIFICANT CHANGE UP (ref 135–145)
SODIUM SERPL-SCNC: 146 MMOL/L — HIGH (ref 135–145)
SODIUM SERPL-SCNC: 147 MMOL/L — HIGH (ref 135–145)
SODIUM SERPL-SCNC: 147 MMOL/L — HIGH (ref 135–145)
SODIUM SERPL-SCNC: 148 MMOL/L — HIGH (ref 135–145)
SODIUM SERPL-SCNC: 149 MMOL/L — HIGH (ref 135–145)
SODIUM SERPL-SCNC: 150 MMOL/L — HIGH (ref 135–145)
SODIUM SERPL-SCNC: 150 MMOL/L — HIGH (ref 135–145)
SP GR SPEC: 1.02 — SIGNIFICANT CHANGE UP (ref 1.01–1.02)
SP GR SPEC: 1.02 — SIGNIFICANT CHANGE UP (ref 1.01–1.02)
SPECIMEN SOURCE: SIGNIFICANT CHANGE UP
STOMATOCYTES BLD QL SMEAR: PRESENT — SIGNIFICANT CHANGE UP
T-CELL CD4 SUBSET PNL BLD: 999 /UL — SIGNIFICANT CHANGE UP (ref 489–1457)
UROBILINOGEN FLD QL: 1 MG/DL
UROBILINOGEN FLD QL: 4 MG/DL
VANCOMYCIN TROUGH SERPL-MCNC: 16.4 UG/ML — SIGNIFICANT CHANGE UP (ref 10–20)
VANCOMYCIN TROUGH SERPL-MCNC: 16.7 UG/ML — SIGNIFICANT CHANGE UP (ref 10–20)
VANCOMYCIN TROUGH SERPL-MCNC: 35 UG/ML — CRITICAL HIGH (ref 10–20)
VANCOMYCIN TROUGH SERPL-MCNC: 38.9 UG/ML — CRITICAL HIGH (ref 10–20)
WBC # BLD: 10.3 K/UL — SIGNIFICANT CHANGE UP (ref 3.8–10.5)
WBC # BLD: 10.4 K/UL — SIGNIFICANT CHANGE UP (ref 3.8–10.5)
WBC # BLD: 12.3 K/UL — HIGH (ref 3.8–10.5)
WBC # BLD: 13.1 K/UL — HIGH (ref 3.8–10.5)
WBC # BLD: 13.7 K/UL — HIGH (ref 3.8–10.5)
WBC # BLD: 17.7 K/UL — HIGH (ref 3.8–10.5)
WBC # BLD: 20.3 K/UL — HIGH (ref 3.8–10.5)
WBC # BLD: 24.9 K/UL — HIGH (ref 3.8–10.5)
WBC # BLD: 8.7 K/UL — SIGNIFICANT CHANGE UP (ref 3.8–10.5)
WBC # BLD: 8.9 K/UL — SIGNIFICANT CHANGE UP (ref 3.8–10.5)
WBC # BLD: 9.6 K/UL — SIGNIFICANT CHANGE UP (ref 3.8–10.5)
WBC # BLD: 9.6 K/UL — SIGNIFICANT CHANGE UP (ref 3.8–10.5)
WBC # BLD: 9.8 K/UL — SIGNIFICANT CHANGE UP (ref 3.8–10.5)
WBC # FLD AUTO: 10.3 K/UL — SIGNIFICANT CHANGE UP (ref 3.8–10.5)
WBC # FLD AUTO: 10.4 K/UL — SIGNIFICANT CHANGE UP (ref 3.8–10.5)
WBC # FLD AUTO: 12.3 K/UL — HIGH (ref 3.8–10.5)
WBC # FLD AUTO: 13.1 K/UL — HIGH (ref 3.8–10.5)
WBC # FLD AUTO: 13.7 K/UL — HIGH (ref 3.8–10.5)
WBC # FLD AUTO: 17.7 K/UL — HIGH (ref 3.8–10.5)
WBC # FLD AUTO: 20.3 K/UL — HIGH (ref 3.8–10.5)
WBC # FLD AUTO: 24.9 K/UL — HIGH (ref 3.8–10.5)
WBC # FLD AUTO: 8.7 K/UL — SIGNIFICANT CHANGE UP (ref 3.8–10.5)
WBC # FLD AUTO: 8.9 K/UL — SIGNIFICANT CHANGE UP (ref 3.8–10.5)
WBC # FLD AUTO: 9.6 K/UL — SIGNIFICANT CHANGE UP (ref 3.8–10.5)
WBC # FLD AUTO: 9.6 K/UL — SIGNIFICANT CHANGE UP (ref 3.8–10.5)
WBC # FLD AUTO: 9.8 K/UL — SIGNIFICANT CHANGE UP (ref 3.8–10.5)
WBC UR QL: ABNORMAL
WBC UR QL: SIGNIFICANT CHANGE UP

## 2017-01-01 PROCEDURE — 74000: CPT | Mod: 26

## 2017-01-01 PROCEDURE — 71010: CPT | Mod: 26

## 2017-01-01 PROCEDURE — 99497 ADVNCD CARE PLAN 30 MIN: CPT

## 2017-01-01 PROCEDURE — 99233 SBSQ HOSP IP/OBS HIGH 50: CPT

## 2017-01-01 PROCEDURE — 99239 HOSP IP/OBS DSCHRG MGMT >30: CPT

## 2017-01-01 PROCEDURE — 72220 X-RAY EXAM SACRUM TAILBONE: CPT | Mod: 26

## 2017-01-01 PROCEDURE — 99285 EMERGENCY DEPT VISIT HI MDM: CPT

## 2017-01-01 PROCEDURE — 99231 SBSQ HOSP IP/OBS SF/LOW 25: CPT

## 2017-01-01 PROCEDURE — 74177 CT ABD & PELVIS W/CONTRAST: CPT | Mod: 26

## 2017-01-01 PROCEDURE — 99291 CRITICAL CARE FIRST HOUR: CPT

## 2017-01-01 PROCEDURE — 93970 EXTREMITY STUDY: CPT | Mod: 26

## 2017-01-01 PROCEDURE — 99223 1ST HOSP IP/OBS HIGH 75: CPT

## 2017-01-01 RX ORDER — SENNA PLUS 8.6 MG/1
2 TABLET ORAL
Qty: 0 | Refills: 0 | COMMUNITY
Start: 2017-01-01

## 2017-01-01 RX ORDER — SODIUM CHLORIDE 9 MG/ML
1000 INJECTION, SOLUTION INTRAVENOUS
Qty: 0 | Refills: 0 | Status: DISCONTINUED | OUTPATIENT
Start: 2017-01-01 | End: 2017-01-01

## 2017-01-01 RX ORDER — ACETAMINOPHEN 500 MG
650 TABLET ORAL ONCE
Qty: 0 | Refills: 0 | Status: COMPLETED | OUTPATIENT
Start: 2017-01-01 | End: 2017-01-01

## 2017-01-01 RX ORDER — CEFTRIAXONE 500 MG/1
INJECTION, POWDER, FOR SOLUTION INTRAMUSCULAR; INTRAVENOUS
Qty: 0 | Refills: 0 | Status: DISCONTINUED | OUTPATIENT
Start: 2017-01-01 | End: 2017-01-01

## 2017-01-01 RX ORDER — SOD SULF/SODIUM/NAHCO3/KCL/PEG
2000 SOLUTION, RECONSTITUTED, ORAL ORAL ONCE
Qty: 0 | Refills: 0 | Status: COMPLETED | OUTPATIENT
Start: 2017-01-01 | End: 2017-01-01

## 2017-01-01 RX ORDER — POTASSIUM CHLORIDE 20 MEQ
10 PACKET (EA) ORAL
Qty: 0 | Refills: 0 | Status: COMPLETED | OUTPATIENT
Start: 2017-01-01 | End: 2017-01-01

## 2017-01-01 RX ORDER — PSYLLIUM SEED (WITH DEXTROSE)
1 POWDER (GRAM) ORAL
Qty: 0 | Refills: 0 | COMMUNITY

## 2017-01-01 RX ORDER — MORPHINE SULFATE 50 MG/1
2 CAPSULE, EXTENDED RELEASE ORAL ONCE
Qty: 0 | Refills: 0 | Status: DISCONTINUED | OUTPATIENT
Start: 2017-01-01 | End: 2017-01-01

## 2017-01-01 RX ORDER — MEROPENEM 1 G/30ML
500 INJECTION INTRAVENOUS ONCE
Qty: 0 | Refills: 0 | Status: COMPLETED | OUTPATIENT
Start: 2017-01-01 | End: 2017-01-01

## 2017-01-01 RX ORDER — SENNA PLUS 8.6 MG/1
2 TABLET ORAL
Qty: 60 | Refills: 1 | OUTPATIENT
Start: 2017-01-01 | End: 2017-01-01

## 2017-01-01 RX ORDER — SODIUM CHLORIDE 9 MG/ML
3 INJECTION INTRAMUSCULAR; INTRAVENOUS; SUBCUTANEOUS ONCE
Qty: 0 | Refills: 0 | Status: COMPLETED | OUTPATIENT
Start: 2017-01-01 | End: 2017-01-01

## 2017-01-01 RX ORDER — METOPROLOL TARTRATE 50 MG
1 TABLET ORAL
Qty: 0 | Refills: 0 | COMMUNITY

## 2017-01-01 RX ORDER — HEPARIN SODIUM 5000 [USP'U]/ML
5000 INJECTION INTRAVENOUS; SUBCUTANEOUS EVERY 12 HOURS
Qty: 0 | Refills: 0 | Status: DISCONTINUED | OUTPATIENT
Start: 2017-01-01 | End: 2017-01-01

## 2017-01-01 RX ORDER — MORPHINE SULFATE 50 MG/1
2 CAPSULE, EXTENDED RELEASE ORAL EVERY 4 HOURS
Qty: 0 | Refills: 0 | Status: DISCONTINUED | OUTPATIENT
Start: 2017-01-01 | End: 2017-01-01

## 2017-01-01 RX ORDER — GENTAMICIN SULFATE 40 MG/ML
120 VIAL (ML) INJECTION ONCE
Qty: 0 | Refills: 0 | Status: COMPLETED | OUTPATIENT
Start: 2017-01-01 | End: 2017-01-01

## 2017-01-01 RX ORDER — FUROSEMIDE 40 MG
20 TABLET ORAL ONCE
Qty: 0 | Refills: 0 | Status: COMPLETED | OUTPATIENT
Start: 2017-01-01 | End: 2017-01-01

## 2017-01-01 RX ORDER — PSYLLIUM SEED (WITH DEXTROSE)
1 POWDER (GRAM) ORAL DAILY
Qty: 0 | Refills: 0 | Status: DISCONTINUED | OUTPATIENT
Start: 2017-01-01 | End: 2017-01-01

## 2017-01-01 RX ORDER — POTASSIUM CHLORIDE 20 MEQ
40 PACKET (EA) ORAL ONCE
Qty: 0 | Refills: 0 | Status: COMPLETED | OUTPATIENT
Start: 2017-01-01 | End: 2017-01-01

## 2017-01-01 RX ORDER — PANTOPRAZOLE SODIUM 20 MG/1
40 TABLET, DELAYED RELEASE ORAL DAILY
Qty: 0 | Refills: 0 | Status: DISCONTINUED | OUTPATIENT
Start: 2017-01-01 | End: 2017-01-01

## 2017-01-01 RX ORDER — CEFTRIAXONE 500 MG/1
1 INJECTION, POWDER, FOR SOLUTION INTRAMUSCULAR; INTRAVENOUS ONCE
Qty: 0 | Refills: 0 | Status: COMPLETED | OUTPATIENT
Start: 2017-01-01 | End: 2017-01-01

## 2017-01-01 RX ORDER — POTASSIUM CHLORIDE 20 MEQ
10 PACKET (EA) ORAL ONCE
Qty: 0 | Refills: 0 | Status: COMPLETED | OUTPATIENT
Start: 2017-01-01 | End: 2017-01-01

## 2017-01-01 RX ORDER — COLLAGENASE CLOSTRIDIUM HIST. 250 UNIT/G
1 OINTMENT (GRAM) TOPICAL
Qty: 0 | Refills: 0 | Status: DISCONTINUED | OUTPATIENT
Start: 2017-01-01 | End: 2017-01-01

## 2017-01-01 RX ORDER — ACETAMINOPHEN 500 MG
650 TABLET ORAL EVERY 6 HOURS
Qty: 0 | Refills: 0 | Status: DISCONTINUED | OUTPATIENT
Start: 2017-01-01 | End: 2017-01-01

## 2017-01-01 RX ORDER — CEFEPIME 1 G/1
1000 INJECTION, POWDER, FOR SOLUTION INTRAMUSCULAR; INTRAVENOUS ONCE
Qty: 0 | Refills: 0 | Status: COMPLETED | OUTPATIENT
Start: 2017-01-01 | End: 2017-01-01

## 2017-01-01 RX ORDER — PIPERACILLIN AND TAZOBACTAM 4; .5 G/20ML; G/20ML
3.38 INJECTION, POWDER, LYOPHILIZED, FOR SOLUTION INTRAVENOUS ONCE
Qty: 0 | Refills: 0 | Status: COMPLETED | OUTPATIENT
Start: 2017-01-01 | End: 2017-01-01

## 2017-01-01 RX ORDER — SODIUM CHLORIDE 9 MG/ML
500 INJECTION INTRAMUSCULAR; INTRAVENOUS; SUBCUTANEOUS ONCE
Qty: 0 | Refills: 0 | Status: COMPLETED | OUTPATIENT
Start: 2017-01-01 | End: 2017-01-01

## 2017-01-01 RX ORDER — POTASSIUM CHLORIDE 20 MEQ
20 PACKET (EA) ORAL ONCE
Qty: 0 | Refills: 0 | Status: COMPLETED | OUTPATIENT
Start: 2017-01-01 | End: 2017-01-01

## 2017-01-01 RX ORDER — PSYLLIUM SEED (WITH DEXTROSE)
1 POWDER (GRAM) ORAL
Qty: 30 | Refills: 1 | OUTPATIENT
Start: 2017-01-01 | End: 2017-01-01

## 2017-01-01 RX ORDER — IPRATROPIUM/ALBUTEROL SULFATE 18-103MCG
3 AEROSOL WITH ADAPTER (GRAM) INHALATION EVERY 6 HOURS
Qty: 0 | Refills: 0 | Status: DISCONTINUED | OUTPATIENT
Start: 2017-01-01 | End: 2017-01-01

## 2017-01-01 RX ORDER — POTASSIUM PHOSPHATE, MONOBASIC POTASSIUM PHOSPHATE, DIBASIC 236; 224 MG/ML; MG/ML
15 INJECTION, SOLUTION INTRAVENOUS ONCE
Qty: 0 | Refills: 0 | Status: COMPLETED | OUTPATIENT
Start: 2017-01-01 | End: 2017-01-01

## 2017-01-01 RX ORDER — VANCOMYCIN HCL 1 G
1000 VIAL (EA) INTRAVENOUS EVERY 12 HOURS
Qty: 0 | Refills: 0 | Status: DISCONTINUED | OUTPATIENT
Start: 2017-01-01 | End: 2017-01-01

## 2017-01-01 RX ORDER — POLYETHYLENE GLYCOL 3350 17 G/17G
17 POWDER, FOR SOLUTION ORAL
Qty: 0 | Refills: 0 | COMMUNITY
Start: 2017-01-01

## 2017-01-01 RX ORDER — POLYETHYLENE GLYCOL 3350 17 G/17G
17 POWDER, FOR SOLUTION ORAL DAILY
Qty: 0 | Refills: 0 | Status: DISCONTINUED | OUTPATIENT
Start: 2017-01-01 | End: 2017-01-01

## 2017-01-01 RX ORDER — OXYCODONE HYDROCHLORIDE 5 MG/1
10 TABLET ORAL EVERY 12 HOURS
Qty: 0 | Refills: 0 | Status: DISCONTINUED | OUTPATIENT
Start: 2017-01-01 | End: 2017-01-01

## 2017-01-01 RX ORDER — VANCOMYCIN HCL 1 G
1000 VIAL (EA) INTRAVENOUS ONCE
Qty: 0 | Refills: 0 | Status: COMPLETED | OUTPATIENT
Start: 2017-01-01 | End: 2017-01-01

## 2017-01-01 RX ORDER — DOCUSATE SODIUM 100 MG
100 CAPSULE ORAL THREE TIMES A DAY
Qty: 0 | Refills: 0 | Status: DISCONTINUED | OUTPATIENT
Start: 2017-01-01 | End: 2017-01-01

## 2017-01-01 RX ORDER — BACLOFEN 100 %
20 POWDER (GRAM) MISCELLANEOUS
Qty: 0 | Refills: 0 | Status: DISCONTINUED | OUTPATIENT
Start: 2017-01-01 | End: 2017-01-01

## 2017-01-01 RX ORDER — MEROPENEM 1 G/30ML
500 INJECTION INTRAVENOUS EVERY 12 HOURS
Qty: 0 | Refills: 0 | Status: DISCONTINUED | OUTPATIENT
Start: 2017-01-01 | End: 2017-01-01

## 2017-01-01 RX ORDER — SODIUM CHLORIDE 9 MG/ML
1000 INJECTION INTRAMUSCULAR; INTRAVENOUS; SUBCUTANEOUS ONCE
Qty: 0 | Refills: 0 | Status: COMPLETED | OUTPATIENT
Start: 2017-01-01 | End: 2017-01-01

## 2017-01-01 RX ORDER — POLYETHYLENE GLYCOL 3350 17 G/17G
17 POWDER, FOR SOLUTION ORAL
Qty: 510 | Refills: 1 | OUTPATIENT
Start: 2017-01-01 | End: 2017-01-01

## 2017-01-01 RX ORDER — MEROPENEM 1 G/30ML
INJECTION INTRAVENOUS
Qty: 0 | Refills: 0 | Status: DISCONTINUED | OUTPATIENT
Start: 2017-01-01 | End: 2017-01-01

## 2017-01-01 RX ORDER — SODIUM CHLORIDE 9 MG/ML
1000 INJECTION INTRAMUSCULAR; INTRAVENOUS; SUBCUTANEOUS
Qty: 0 | Refills: 0 | Status: DISCONTINUED | OUTPATIENT
Start: 2017-01-01 | End: 2017-01-01

## 2017-01-01 RX ORDER — METOPROLOL TARTRATE 50 MG
50 TABLET ORAL
Qty: 0 | Refills: 0 | Status: DISCONTINUED | OUTPATIENT
Start: 2017-01-01 | End: 2017-01-01

## 2017-01-01 RX ORDER — OXYCODONE HYDROCHLORIDE 5 MG/1
10 TABLET ORAL EVERY 8 HOURS
Qty: 0 | Refills: 0 | Status: DISCONTINUED | OUTPATIENT
Start: 2017-01-01 | End: 2017-01-01

## 2017-01-01 RX ORDER — POTASSIUM CHLORIDE 20 MEQ
10 PACKET (EA) ORAL
Qty: 0 | Refills: 0 | Status: DISCONTINUED | OUTPATIENT
Start: 2017-01-01 | End: 2017-01-01

## 2017-01-01 RX ORDER — IOHEXOL 300 MG/ML
30 INJECTION, SOLUTION INTRAVENOUS ONCE
Qty: 0 | Refills: 0 | Status: COMPLETED | OUTPATIENT
Start: 2017-01-01 | End: 2017-01-01

## 2017-01-01 RX ORDER — BACLOFEN 100 %
20 POWDER (GRAM) MISCELLANEOUS
Qty: 0 | Refills: 0 | COMMUNITY

## 2017-01-01 RX ORDER — KETOROLAC TROMETHAMINE 30 MG/ML
15 SYRINGE (ML) INJECTION ONCE
Qty: 0 | Refills: 0 | Status: DISCONTINUED | OUTPATIENT
Start: 2017-01-01 | End: 2017-01-01

## 2017-01-01 RX ORDER — POTASSIUM CHLORIDE 20 MEQ
40 PACKET (EA) ORAL ONCE
Qty: 0 | Refills: 0 | Status: DISCONTINUED | OUTPATIENT
Start: 2017-01-01 | End: 2017-01-01

## 2017-01-01 RX ORDER — POTASSIUM CHLORIDE 20 MEQ
40 PACKET (EA) ORAL EVERY 4 HOURS
Qty: 0 | Refills: 0 | Status: COMPLETED | OUTPATIENT
Start: 2017-01-01 | End: 2017-01-01

## 2017-01-01 RX ORDER — PIPERACILLIN AND TAZOBACTAM 4; .5 G/20ML; G/20ML
3.38 INJECTION, POWDER, LYOPHILIZED, FOR SOLUTION INTRAVENOUS EVERY 8 HOURS
Qty: 0 | Refills: 0 | Status: DISCONTINUED | OUTPATIENT
Start: 2017-01-01 | End: 2017-01-01

## 2017-01-01 RX ORDER — CEFTRIAXONE 500 MG/1
1 INJECTION, POWDER, FOR SOLUTION INTRAMUSCULAR; INTRAVENOUS EVERY 24 HOURS
Qty: 0 | Refills: 0 | Status: DISCONTINUED | OUTPATIENT
Start: 2017-01-01 | End: 2017-01-01

## 2017-01-01 RX ORDER — SENNA PLUS 8.6 MG/1
2 TABLET ORAL AT BEDTIME
Qty: 0 | Refills: 0 | Status: DISCONTINUED | OUTPATIENT
Start: 2017-01-01 | End: 2017-01-01

## 2017-01-01 RX ADMIN — Medication 1 APPLICATION(S): at 06:09

## 2017-01-01 RX ADMIN — MORPHINE SULFATE 2 MILLIGRAM(S): 50 CAPSULE, EXTENDED RELEASE ORAL at 06:15

## 2017-01-01 RX ADMIN — MORPHINE SULFATE 2 MILLIGRAM(S): 50 CAPSULE, EXTENDED RELEASE ORAL at 00:30

## 2017-01-01 RX ADMIN — Medication 250 MILLIGRAM(S): at 18:19

## 2017-01-01 RX ADMIN — MORPHINE SULFATE 2 MILLIGRAM(S): 50 CAPSULE, EXTENDED RELEASE ORAL at 03:02

## 2017-01-01 RX ADMIN — MORPHINE SULFATE 2 MILLIGRAM(S): 50 CAPSULE, EXTENDED RELEASE ORAL at 17:08

## 2017-01-01 RX ADMIN — MORPHINE SULFATE 2 MILLIGRAM(S): 50 CAPSULE, EXTENDED RELEASE ORAL at 08:17

## 2017-01-01 RX ADMIN — Medication 50 MILLIGRAM(S): at 18:52

## 2017-01-01 RX ADMIN — MORPHINE SULFATE 2 MILLIGRAM(S): 50 CAPSULE, EXTENDED RELEASE ORAL at 19:00

## 2017-01-01 RX ADMIN — SODIUM CHLORIDE 500 MILLILITER(S): 9 INJECTION INTRAMUSCULAR; INTRAVENOUS; SUBCUTANEOUS at 01:52

## 2017-01-01 RX ADMIN — MORPHINE SULFATE 2 MILLIGRAM(S): 50 CAPSULE, EXTENDED RELEASE ORAL at 05:25

## 2017-01-01 RX ADMIN — HEPARIN SODIUM 5000 UNIT(S): 5000 INJECTION INTRAVENOUS; SUBCUTANEOUS at 17:53

## 2017-01-01 RX ADMIN — SODIUM CHLORIDE 3 MILLILITER(S): 9 INJECTION INTRAMUSCULAR; INTRAVENOUS; SUBCUTANEOUS at 14:44

## 2017-01-01 RX ADMIN — Medication 1 ENEMA: at 17:49

## 2017-01-01 RX ADMIN — HEPARIN SODIUM 5000 UNIT(S): 5000 INJECTION INTRAVENOUS; SUBCUTANEOUS at 18:18

## 2017-01-01 RX ADMIN — Medication 1 PACKET(S): at 11:18

## 2017-01-01 RX ADMIN — Medication 20 MILLIGRAM(S): at 17:08

## 2017-01-01 RX ADMIN — MEROPENEM 200 MILLIGRAM(S): 1 INJECTION INTRAVENOUS at 18:08

## 2017-01-01 RX ADMIN — SODIUM CHLORIDE 100 MILLILITER(S): 9 INJECTION, SOLUTION INTRAVENOUS at 03:30

## 2017-01-01 RX ADMIN — Medication 1 TABLET(S): at 13:05

## 2017-01-01 RX ADMIN — HEPARIN SODIUM 5000 UNIT(S): 5000 INJECTION INTRAVENOUS; SUBCUTANEOUS at 05:21

## 2017-01-01 RX ADMIN — Medication 20 MILLIGRAM(S): at 05:45

## 2017-01-01 RX ADMIN — POTASSIUM PHOSPHATE, MONOBASIC POTASSIUM PHOSPHATE, DIBASIC 63.75 MILLIMOLE(S): 236; 224 INJECTION, SOLUTION INTRAVENOUS at 16:26

## 2017-01-01 RX ADMIN — SODIUM CHLORIDE 50 MILLILITER(S): 9 INJECTION INTRAMUSCULAR; INTRAVENOUS; SUBCUTANEOUS at 19:01

## 2017-01-01 RX ADMIN — Medication 40 MILLIEQUIVALENT(S): at 14:17

## 2017-01-01 RX ADMIN — SODIUM CHLORIDE 100 MILLILITER(S): 9 INJECTION, SOLUTION INTRAVENOUS at 01:06

## 2017-01-01 RX ADMIN — MORPHINE SULFATE 2 MILLIGRAM(S): 50 CAPSULE, EXTENDED RELEASE ORAL at 07:07

## 2017-01-01 RX ADMIN — MORPHINE SULFATE 2 MILLIGRAM(S): 50 CAPSULE, EXTENDED RELEASE ORAL at 17:45

## 2017-01-01 RX ADMIN — Medication 40 MILLIEQUIVALENT(S): at 08:17

## 2017-01-01 RX ADMIN — POTASSIUM PHOSPHATE, MONOBASIC POTASSIUM PHOSPHATE, DIBASIC 63.75 MILLIMOLE(S): 236; 224 INJECTION, SOLUTION INTRAVENOUS at 23:25

## 2017-01-01 RX ADMIN — PANTOPRAZOLE SODIUM 40 MILLIGRAM(S): 20 TABLET, DELAYED RELEASE ORAL at 11:38

## 2017-01-01 RX ADMIN — Medication 1 APPLICATION(S): at 05:07

## 2017-01-01 RX ADMIN — SODIUM CHLORIDE 1000 MILLILITER(S): 9 INJECTION INTRAMUSCULAR; INTRAVENOUS; SUBCUTANEOUS at 00:57

## 2017-01-01 RX ADMIN — Medication 1 ENEMA: at 18:42

## 2017-01-01 RX ADMIN — Medication 20 MILLIGRAM(S): at 05:55

## 2017-01-01 RX ADMIN — HEPARIN SODIUM 5000 UNIT(S): 5000 INJECTION INTRAVENOUS; SUBCUTANEOUS at 17:07

## 2017-01-01 RX ADMIN — Medication 20 MILLIGRAM(S): at 06:01

## 2017-01-01 RX ADMIN — MORPHINE SULFATE 2 MILLIGRAM(S): 50 CAPSULE, EXTENDED RELEASE ORAL at 10:30

## 2017-01-01 RX ADMIN — Medication 1 APPLICATION(S): at 17:08

## 2017-01-01 RX ADMIN — Medication 100 MILLIEQUIVALENT(S): at 12:38

## 2017-01-01 RX ADMIN — Medication 250 MILLIGRAM(S): at 06:11

## 2017-01-01 RX ADMIN — Medication 1 APPLICATION(S): at 17:09

## 2017-01-01 RX ADMIN — Medication 100 MILLIEQUIVALENT(S): at 15:29

## 2017-01-01 RX ADMIN — PIPERACILLIN AND TAZOBACTAM 25 GRAM(S): 4; .5 INJECTION, POWDER, LYOPHILIZED, FOR SOLUTION INTRAVENOUS at 05:11

## 2017-01-01 RX ADMIN — Medication 250 MILLIGRAM(S): at 17:47

## 2017-01-01 RX ADMIN — HEPARIN SODIUM 5000 UNIT(S): 5000 INJECTION INTRAVENOUS; SUBCUTANEOUS at 17:09

## 2017-01-01 RX ADMIN — HEPARIN SODIUM 5000 UNIT(S): 5000 INJECTION INTRAVENOUS; SUBCUTANEOUS at 05:25

## 2017-01-01 RX ADMIN — PIPERACILLIN AND TAZOBACTAM 25 GRAM(S): 4; .5 INJECTION, POWDER, LYOPHILIZED, FOR SOLUTION INTRAVENOUS at 22:54

## 2017-01-01 RX ADMIN — Medication 20 MILLIGRAM(S): at 17:23

## 2017-01-01 RX ADMIN — HEPARIN SODIUM 5000 UNIT(S): 5000 INJECTION INTRAVENOUS; SUBCUTANEOUS at 17:47

## 2017-01-01 RX ADMIN — MORPHINE SULFATE 2 MILLIGRAM(S): 50 CAPSULE, EXTENDED RELEASE ORAL at 00:40

## 2017-01-01 RX ADMIN — SODIUM CHLORIDE 120 MILLILITER(S): 9 INJECTION INTRAMUSCULAR; INTRAVENOUS; SUBCUTANEOUS at 06:23

## 2017-01-01 RX ADMIN — Medication 100 MILLIGRAM(S): at 14:18

## 2017-01-01 RX ADMIN — Medication 1 TABLET(S): at 11:33

## 2017-01-01 RX ADMIN — MORPHINE SULFATE 2 MILLIGRAM(S): 50 CAPSULE, EXTENDED RELEASE ORAL at 04:45

## 2017-01-01 RX ADMIN — HEPARIN SODIUM 5000 UNIT(S): 5000 INJECTION INTRAVENOUS; SUBCUTANEOUS at 05:56

## 2017-01-01 RX ADMIN — POLYETHYLENE GLYCOL 3350 17 GRAM(S): 17 POWDER, FOR SOLUTION ORAL at 12:01

## 2017-01-01 RX ADMIN — Medication 50 MILLIGRAM(S): at 05:44

## 2017-01-01 RX ADMIN — Medication 40 MILLIEQUIVALENT(S): at 22:14

## 2017-01-01 RX ADMIN — PIPERACILLIN AND TAZOBACTAM 25 GRAM(S): 4; .5 INJECTION, POWDER, LYOPHILIZED, FOR SOLUTION INTRAVENOUS at 05:56

## 2017-01-01 RX ADMIN — PANTOPRAZOLE SODIUM 40 MILLIGRAM(S): 20 TABLET, DELAYED RELEASE ORAL at 14:57

## 2017-01-01 RX ADMIN — CEFTRIAXONE 100 GRAM(S): 500 INJECTION, POWDER, FOR SOLUTION INTRAMUSCULAR; INTRAVENOUS at 11:10

## 2017-01-01 RX ADMIN — HEPARIN SODIUM 5000 UNIT(S): 5000 INJECTION INTRAVENOUS; SUBCUTANEOUS at 06:01

## 2017-01-01 RX ADMIN — MORPHINE SULFATE 2 MILLIGRAM(S): 50 CAPSULE, EXTENDED RELEASE ORAL at 19:17

## 2017-01-01 RX ADMIN — Medication 100 MILLIEQUIVALENT(S): at 06:01

## 2017-01-01 RX ADMIN — Medication 2000 MILLILITER(S): at 12:38

## 2017-01-01 RX ADMIN — SODIUM CHLORIDE 80 MILLILITER(S): 9 INJECTION, SOLUTION INTRAVENOUS at 21:55

## 2017-01-01 RX ADMIN — SODIUM CHLORIDE 100 MILLILITER(S): 9 INJECTION, SOLUTION INTRAVENOUS at 05:57

## 2017-01-01 RX ADMIN — MORPHINE SULFATE 2 MILLIGRAM(S): 50 CAPSULE, EXTENDED RELEASE ORAL at 13:51

## 2017-01-01 RX ADMIN — Medication 1 PACKET(S): at 13:05

## 2017-01-01 RX ADMIN — Medication 2000 MILLILITER(S): at 12:58

## 2017-01-01 RX ADMIN — Medication 40 MILLIEQUIVALENT(S): at 17:53

## 2017-01-01 RX ADMIN — PIPERACILLIN AND TAZOBACTAM 25 GRAM(S): 4; .5 INJECTION, POWDER, LYOPHILIZED, FOR SOLUTION INTRAVENOUS at 21:31

## 2017-01-01 RX ADMIN — Medication 20 MILLIGRAM(S): at 18:20

## 2017-01-01 RX ADMIN — SODIUM CHLORIDE 65 MILLILITER(S): 9 INJECTION, SOLUTION INTRAVENOUS at 22:34

## 2017-01-01 RX ADMIN — SENNA PLUS 2 TABLET(S): 8.6 TABLET ORAL at 22:14

## 2017-01-01 RX ADMIN — MORPHINE SULFATE 2 MILLIGRAM(S): 50 CAPSULE, EXTENDED RELEASE ORAL at 15:17

## 2017-01-01 RX ADMIN — PIPERACILLIN AND TAZOBACTAM 25 GRAM(S): 4; .5 INJECTION, POWDER, LYOPHILIZED, FOR SOLUTION INTRAVENOUS at 14:06

## 2017-01-01 RX ADMIN — Medication 1 APPLICATION(S): at 06:40

## 2017-01-01 RX ADMIN — MORPHINE SULFATE 2 MILLIGRAM(S): 50 CAPSULE, EXTENDED RELEASE ORAL at 09:51

## 2017-01-01 RX ADMIN — PIPERACILLIN AND TAZOBACTAM 25 GRAM(S): 4; .5 INJECTION, POWDER, LYOPHILIZED, FOR SOLUTION INTRAVENOUS at 15:43

## 2017-01-01 RX ADMIN — POLYETHYLENE GLYCOL 3350 17 GRAM(S): 17 POWDER, FOR SOLUTION ORAL at 11:18

## 2017-01-01 RX ADMIN — Medication 650 MILLIGRAM(S): at 00:40

## 2017-01-01 RX ADMIN — HEPARIN SODIUM 5000 UNIT(S): 5000 INJECTION INTRAVENOUS; SUBCUTANEOUS at 05:57

## 2017-01-01 RX ADMIN — Medication 20 MILLIGRAM(S): at 17:47

## 2017-01-01 RX ADMIN — PANTOPRAZOLE SODIUM 40 MILLIGRAM(S): 20 TABLET, DELAYED RELEASE ORAL at 12:31

## 2017-01-01 RX ADMIN — MORPHINE SULFATE 2 MILLIGRAM(S): 50 CAPSULE, EXTENDED RELEASE ORAL at 00:15

## 2017-01-01 RX ADMIN — Medication 2000 MILLILITER(S): at 12:29

## 2017-01-01 RX ADMIN — Medication 20 MILLIGRAM(S): at 05:24

## 2017-01-01 RX ADMIN — SODIUM CHLORIDE 50 MILLILITER(S): 9 INJECTION, SOLUTION INTRAVENOUS at 12:30

## 2017-01-01 RX ADMIN — Medication 1 ENEMA: at 10:04

## 2017-01-01 RX ADMIN — PANTOPRAZOLE SODIUM 40 MILLIGRAM(S): 20 TABLET, DELAYED RELEASE ORAL at 12:38

## 2017-01-01 RX ADMIN — Medication 20 MILLIGRAM(S): at 18:17

## 2017-01-01 RX ADMIN — Medication 100 MILLIEQUIVALENT(S): at 04:54

## 2017-01-01 RX ADMIN — Medication 1 APPLICATION(S): at 18:18

## 2017-01-01 RX ADMIN — SODIUM CHLORIDE 50 MILLILITER(S): 9 INJECTION, SOLUTION INTRAVENOUS at 05:24

## 2017-01-01 RX ADMIN — Medication 1 ENEMA: at 21:27

## 2017-01-01 RX ADMIN — Medication 250 MILLIGRAM(S): at 17:08

## 2017-01-01 RX ADMIN — MORPHINE SULFATE 2 MILLIGRAM(S): 50 CAPSULE, EXTENDED RELEASE ORAL at 06:51

## 2017-01-01 RX ADMIN — PIPERACILLIN AND TAZOBACTAM 25 GRAM(S): 4; .5 INJECTION, POWDER, LYOPHILIZED, FOR SOLUTION INTRAVENOUS at 21:23

## 2017-01-01 RX ADMIN — CEFTRIAXONE 100 GRAM(S): 500 INJECTION, POWDER, FOR SOLUTION INTRAMUSCULAR; INTRAVENOUS at 06:54

## 2017-01-01 RX ADMIN — PANTOPRAZOLE SODIUM 40 MILLIGRAM(S): 20 TABLET, DELAYED RELEASE ORAL at 11:44

## 2017-01-01 RX ADMIN — Medication 1 TABLET(S): at 11:49

## 2017-01-01 RX ADMIN — SODIUM CHLORIDE 80 MILLILITER(S): 9 INJECTION, SOLUTION INTRAVENOUS at 17:08

## 2017-01-01 RX ADMIN — SODIUM CHLORIDE 120 MILLILITER(S): 9 INJECTION INTRAMUSCULAR; INTRAVENOUS; SUBCUTANEOUS at 05:21

## 2017-01-01 RX ADMIN — Medication 20 MILLIGRAM(S): at 17:07

## 2017-01-01 RX ADMIN — PIPERACILLIN AND TAZOBACTAM 25 GRAM(S): 4; .5 INJECTION, POWDER, LYOPHILIZED, FOR SOLUTION INTRAVENOUS at 14:00

## 2017-01-01 RX ADMIN — Medication 20 MILLIGRAM(S): at 06:40

## 2017-01-01 RX ADMIN — HEPARIN SODIUM 5000 UNIT(S): 5000 INJECTION INTRAVENOUS; SUBCUTANEOUS at 17:23

## 2017-01-01 RX ADMIN — PIPERACILLIN AND TAZOBACTAM 25 GRAM(S): 4; .5 INJECTION, POWDER, LYOPHILIZED, FOR SOLUTION INTRAVENOUS at 06:06

## 2017-01-01 RX ADMIN — Medication 20 MILLIGRAM(S): at 05:44

## 2017-01-01 RX ADMIN — PANTOPRAZOLE SODIUM 40 MILLIGRAM(S): 20 TABLET, DELAYED RELEASE ORAL at 14:53

## 2017-01-01 RX ADMIN — HEPARIN SODIUM 5000 UNIT(S): 5000 INJECTION INTRAVENOUS; SUBCUTANEOUS at 17:48

## 2017-01-01 RX ADMIN — SODIUM CHLORIDE 100 MILLILITER(S): 9 INJECTION, SOLUTION INTRAVENOUS at 12:57

## 2017-01-01 RX ADMIN — POLYETHYLENE GLYCOL 3350 17 GRAM(S): 17 POWDER, FOR SOLUTION ORAL at 12:56

## 2017-01-01 RX ADMIN — Medication 200 MILLIGRAM(S): at 03:46

## 2017-01-01 RX ADMIN — MORPHINE SULFATE 2 MILLIGRAM(S): 50 CAPSULE, EXTENDED RELEASE ORAL at 01:40

## 2017-01-01 RX ADMIN — MEROPENEM 200 MILLIGRAM(S): 1 INJECTION INTRAVENOUS at 05:08

## 2017-01-01 RX ADMIN — MORPHINE SULFATE 2 MILLIGRAM(S): 50 CAPSULE, EXTENDED RELEASE ORAL at 18:07

## 2017-01-01 RX ADMIN — PANTOPRAZOLE SODIUM 40 MILLIGRAM(S): 20 TABLET, DELAYED RELEASE ORAL at 11:10

## 2017-01-01 RX ADMIN — Medication 250 MILLIGRAM(S): at 05:56

## 2017-01-01 RX ADMIN — Medication 100 MILLIEQUIVALENT(S): at 12:31

## 2017-01-01 RX ADMIN — Medication 1 ENEMA: at 13:18

## 2017-01-01 RX ADMIN — SODIUM CHLORIDE 50 MILLILITER(S): 9 INJECTION, SOLUTION INTRAVENOUS at 06:02

## 2017-01-01 RX ADMIN — PIPERACILLIN AND TAZOBACTAM 25 GRAM(S): 4; .5 INJECTION, POWDER, LYOPHILIZED, FOR SOLUTION INTRAVENOUS at 06:00

## 2017-01-01 RX ADMIN — Medication 50 MILLIGRAM(S): at 18:20

## 2017-01-01 RX ADMIN — HEPARIN SODIUM 5000 UNIT(S): 5000 INJECTION INTRAVENOUS; SUBCUTANEOUS at 18:20

## 2017-01-01 RX ADMIN — Medication 1 ENEMA: at 14:57

## 2017-01-01 RX ADMIN — CEFEPIME 100 MILLIGRAM(S): 1 INJECTION, POWDER, FOR SOLUTION INTRAMUSCULAR; INTRAVENOUS at 01:52

## 2017-01-01 RX ADMIN — Medication 1 TABLET(S): at 12:58

## 2017-01-01 RX ADMIN — Medication 650 MILLIGRAM(S): at 00:15

## 2017-01-01 RX ADMIN — SODIUM CHLORIDE 500 MILLILITER(S): 9 INJECTION INTRAMUSCULAR; INTRAVENOUS; SUBCUTANEOUS at 03:45

## 2017-01-01 RX ADMIN — Medication 40 MILLIEQUIVALENT(S): at 17:08

## 2017-01-01 RX ADMIN — CEFTRIAXONE 100 GRAM(S): 500 INJECTION, POWDER, FOR SOLUTION INTRAMUSCULAR; INTRAVENOUS at 08:28

## 2017-01-01 RX ADMIN — Medication 1 ENEMA: at 16:22

## 2017-01-01 RX ADMIN — Medication 40 MILLIEQUIVALENT(S): at 13:46

## 2017-01-01 RX ADMIN — Medication 20 MILLIGRAM(S): at 06:44

## 2017-01-01 RX ADMIN — OXYCODONE HYDROCHLORIDE 10 MILLIGRAM(S): 5 TABLET ORAL at 12:58

## 2017-01-01 RX ADMIN — Medication 650 MILLIGRAM(S): at 20:00

## 2017-01-01 RX ADMIN — PIPERACILLIN AND TAZOBACTAM 25 GRAM(S): 4; .5 INJECTION, POWDER, LYOPHILIZED, FOR SOLUTION INTRAVENOUS at 13:45

## 2017-01-01 RX ADMIN — PIPERACILLIN AND TAZOBACTAM 25 GRAM(S): 4; .5 INJECTION, POWDER, LYOPHILIZED, FOR SOLUTION INTRAVENOUS at 23:22

## 2017-01-01 RX ADMIN — HEPARIN SODIUM 5000 UNIT(S): 5000 INJECTION INTRAVENOUS; SUBCUTANEOUS at 05:12

## 2017-01-01 RX ADMIN — HEPARIN SODIUM 5000 UNIT(S): 5000 INJECTION INTRAVENOUS; SUBCUTANEOUS at 06:23

## 2017-01-01 RX ADMIN — Medication 1 APPLICATION(S): at 18:08

## 2017-01-01 RX ADMIN — MORPHINE SULFATE 2 MILLIGRAM(S): 50 CAPSULE, EXTENDED RELEASE ORAL at 20:13

## 2017-01-01 RX ADMIN — Medication 1 ENEMA: at 18:59

## 2017-01-01 RX ADMIN — Medication 20 MILLIGRAM(S): at 06:06

## 2017-01-01 RX ADMIN — Medication 250 MILLIGRAM(S): at 05:12

## 2017-01-01 RX ADMIN — SODIUM CHLORIDE 65 MILLILITER(S): 9 INJECTION, SOLUTION INTRAVENOUS at 00:50

## 2017-01-01 RX ADMIN — Medication 50 MILLIGRAM(S): at 05:24

## 2017-01-01 RX ADMIN — POLYETHYLENE GLYCOL 3350 17 GRAM(S): 17 POWDER, FOR SOLUTION ORAL at 12:29

## 2017-01-01 RX ADMIN — SODIUM CHLORIDE 80 MILLILITER(S): 9 INJECTION, SOLUTION INTRAVENOUS at 06:44

## 2017-01-01 RX ADMIN — SENNA PLUS 2 TABLET(S): 8.6 TABLET ORAL at 22:29

## 2017-01-01 RX ADMIN — Medication 1 TABLET(S): at 11:18

## 2017-01-01 RX ADMIN — Medication 100 MILLIEQUIVALENT(S): at 10:30

## 2017-01-01 RX ADMIN — HEPARIN SODIUM 5000 UNIT(S): 5000 INJECTION INTRAVENOUS; SUBCUTANEOUS at 06:44

## 2017-01-01 RX ADMIN — Medication 100 MILLIEQUIVALENT(S): at 08:55

## 2017-01-01 RX ADMIN — Medication 1 APPLICATION(S): at 17:07

## 2017-01-01 RX ADMIN — Medication 1 APPLICATION(S): at 17:49

## 2017-01-01 RX ADMIN — Medication 250 MILLIGRAM(S): at 23:43

## 2017-01-01 RX ADMIN — Medication 1 ENEMA: at 08:58

## 2017-01-01 RX ADMIN — Medication 100 MILLIEQUIVALENT(S): at 14:52

## 2017-01-01 RX ADMIN — Medication 20 MILLIGRAM(S): at 05:56

## 2017-01-01 RX ADMIN — MORPHINE SULFATE 2 MILLIGRAM(S): 50 CAPSULE, EXTENDED RELEASE ORAL at 13:45

## 2017-01-01 RX ADMIN — Medication 20 MILLIGRAM(S): at 07:02

## 2017-01-01 RX ADMIN — Medication 100 MILLIEQUIVALENT(S): at 03:46

## 2017-01-01 RX ADMIN — Medication 1 ENEMA: at 16:05

## 2017-01-01 RX ADMIN — Medication 2000 MILLILITER(S): at 18:30

## 2017-01-01 RX ADMIN — SODIUM CHLORIDE 500 MILLILITER(S): 9 INJECTION INTRAMUSCULAR; INTRAVENOUS; SUBCUTANEOUS at 22:22

## 2017-01-01 RX ADMIN — Medication 50 MILLIGRAM(S): at 06:01

## 2017-01-01 RX ADMIN — POLYETHYLENE GLYCOL 3350 17 GRAM(S): 17 POWDER, FOR SOLUTION ORAL at 12:38

## 2017-01-01 RX ADMIN — MORPHINE SULFATE 2 MILLIGRAM(S): 50 CAPSULE, EXTENDED RELEASE ORAL at 00:50

## 2017-01-01 RX ADMIN — Medication 50 MILLIGRAM(S): at 18:18

## 2017-01-01 RX ADMIN — POLYETHYLENE GLYCOL 3350 17 GRAM(S): 17 POWDER, FOR SOLUTION ORAL at 12:58

## 2017-01-01 RX ADMIN — Medication 20 MILLIGRAM(S): at 18:07

## 2017-01-01 RX ADMIN — MORPHINE SULFATE 2 MILLIGRAM(S): 50 CAPSULE, EXTENDED RELEASE ORAL at 06:04

## 2017-01-01 RX ADMIN — SODIUM CHLORIDE 100 MILLILITER(S): 9 INJECTION, SOLUTION INTRAVENOUS at 17:47

## 2017-01-01 RX ADMIN — Medication 100 MILLIEQUIVALENT(S): at 14:23

## 2017-01-01 RX ADMIN — SODIUM CHLORIDE 100 MILLILITER(S): 9 INJECTION, SOLUTION INTRAVENOUS at 14:07

## 2017-01-01 RX ADMIN — POLYETHYLENE GLYCOL 3350 17 GRAM(S): 17 POWDER, FOR SOLUTION ORAL at 11:44

## 2017-01-01 RX ADMIN — SODIUM CHLORIDE 50 MILLILITER(S): 9 INJECTION, SOLUTION INTRAVENOUS at 12:58

## 2017-01-01 RX ADMIN — CEFTRIAXONE 100 GRAM(S): 500 INJECTION, POWDER, FOR SOLUTION INTRAMUSCULAR; INTRAVENOUS at 10:04

## 2017-01-01 RX ADMIN — Medication 1 ENEMA: at 22:09

## 2017-01-01 RX ADMIN — MORPHINE SULFATE 2 MILLIGRAM(S): 50 CAPSULE, EXTENDED RELEASE ORAL at 01:32

## 2017-01-01 RX ADMIN — MORPHINE SULFATE 2 MILLIGRAM(S): 50 CAPSULE, EXTENDED RELEASE ORAL at 17:07

## 2017-01-01 RX ADMIN — Medication 20 MILLIGRAM(S): at 17:53

## 2017-01-01 RX ADMIN — Medication 1 ENEMA: at 17:53

## 2017-01-01 RX ADMIN — MORPHINE SULFATE 2 MILLIGRAM(S): 50 CAPSULE, EXTENDED RELEASE ORAL at 05:56

## 2017-01-01 RX ADMIN — MORPHINE SULFATE 2 MILLIGRAM(S): 50 CAPSULE, EXTENDED RELEASE ORAL at 03:20

## 2017-01-01 RX ADMIN — SODIUM CHLORIDE 120 MILLILITER(S): 9 INJECTION INTRAMUSCULAR; INTRAVENOUS; SUBCUTANEOUS at 18:30

## 2017-01-01 RX ADMIN — HEPARIN SODIUM 5000 UNIT(S): 5000 INJECTION INTRAVENOUS; SUBCUTANEOUS at 17:39

## 2017-01-01 RX ADMIN — Medication 20 MILLIGRAM(S): at 18:52

## 2017-01-01 RX ADMIN — Medication 20 MILLIEQUIVALENT(S): at 19:08

## 2017-01-01 RX ADMIN — HEPARIN SODIUM 5000 UNIT(S): 5000 INJECTION INTRAVENOUS; SUBCUTANEOUS at 05:07

## 2017-01-01 RX ADMIN — PIPERACILLIN AND TAZOBACTAM 25 GRAM(S): 4; .5 INJECTION, POWDER, LYOPHILIZED, FOR SOLUTION INTRAVENOUS at 15:18

## 2017-01-01 RX ADMIN — Medication 50 MILLIGRAM(S): at 05:45

## 2017-01-01 RX ADMIN — Medication 100 MILLIEQUIVALENT(S): at 10:49

## 2017-01-01 RX ADMIN — CEFTRIAXONE 100 GRAM(S): 500 INJECTION, POWDER, FOR SOLUTION INTRAMUSCULAR; INTRAVENOUS at 09:02

## 2017-01-01 RX ADMIN — Medication 1 APPLICATION(S): at 05:54

## 2017-01-01 RX ADMIN — Medication 20 MILLIGRAM(S): at 20:01

## 2017-01-01 RX ADMIN — Medication 1 ENEMA: at 09:02

## 2017-01-01 RX ADMIN — Medication 2000 MILLILITER(S): at 13:18

## 2017-01-01 RX ADMIN — PIPERACILLIN AND TAZOBACTAM 25 GRAM(S): 4; .5 INJECTION, POWDER, LYOPHILIZED, FOR SOLUTION INTRAVENOUS at 13:46

## 2017-01-01 RX ADMIN — Medication 1 TABLET(S): at 12:56

## 2017-01-01 RX ADMIN — SENNA PLUS 2 TABLET(S): 8.6 TABLET ORAL at 22:09

## 2017-01-01 RX ADMIN — Medication 1 APPLICATION(S): at 17:47

## 2017-01-01 RX ADMIN — Medication 1 APPLICATION(S): at 05:56

## 2017-01-01 RX ADMIN — HEPARIN SODIUM 5000 UNIT(S): 5000 INJECTION INTRAVENOUS; SUBCUTANEOUS at 17:49

## 2017-01-01 RX ADMIN — HEPARIN SODIUM 5000 UNIT(S): 5000 INJECTION INTRAVENOUS; SUBCUTANEOUS at 17:08

## 2017-01-01 RX ADMIN — Medication 1 ENEMA: at 12:19

## 2017-01-01 RX ADMIN — PIPERACILLIN AND TAZOBACTAM 25 GRAM(S): 4; .5 INJECTION, POWDER, LYOPHILIZED, FOR SOLUTION INTRAVENOUS at 22:33

## 2017-01-01 RX ADMIN — HEPARIN SODIUM 5000 UNIT(S): 5000 INJECTION INTRAVENOUS; SUBCUTANEOUS at 18:08

## 2017-01-01 RX ADMIN — Medication 20 MILLIGRAM(S): at 18:19

## 2017-01-01 RX ADMIN — HEPARIN SODIUM 5000 UNIT(S): 5000 INJECTION INTRAVENOUS; SUBCUTANEOUS at 05:24

## 2017-01-01 RX ADMIN — Medication 1 ENEMA: at 15:00

## 2017-01-01 RX ADMIN — PIPERACILLIN AND TAZOBACTAM 25 GRAM(S): 4; .5 INJECTION, POWDER, LYOPHILIZED, FOR SOLUTION INTRAVENOUS at 15:33

## 2017-01-01 RX ADMIN — PANTOPRAZOLE SODIUM 40 MILLIGRAM(S): 20 TABLET, DELAYED RELEASE ORAL at 12:19

## 2017-01-01 RX ADMIN — MORPHINE SULFATE 2 MILLIGRAM(S): 50 CAPSULE, EXTENDED RELEASE ORAL at 15:30

## 2017-01-01 RX ADMIN — Medication 40 MILLIEQUIVALENT(S): at 02:02

## 2017-01-01 RX ADMIN — POLYETHYLENE GLYCOL 3350 17 GRAM(S): 17 POWDER, FOR SOLUTION ORAL at 13:05

## 2017-01-01 RX ADMIN — MORPHINE SULFATE 2 MILLIGRAM(S): 50 CAPSULE, EXTENDED RELEASE ORAL at 13:08

## 2017-01-01 RX ADMIN — Medication 1 ENEMA: at 23:15

## 2017-01-01 RX ADMIN — PIPERACILLIN AND TAZOBACTAM 25 GRAM(S): 4; .5 INJECTION, POWDER, LYOPHILIZED, FOR SOLUTION INTRAVENOUS at 22:22

## 2017-01-01 RX ADMIN — HEPARIN SODIUM 5000 UNIT(S): 5000 INJECTION INTRAVENOUS; SUBCUTANEOUS at 05:43

## 2017-01-01 RX ADMIN — MORPHINE SULFATE 2 MILLIGRAM(S): 50 CAPSULE, EXTENDED RELEASE ORAL at 21:30

## 2017-01-01 RX ADMIN — MORPHINE SULFATE 2 MILLIGRAM(S): 50 CAPSULE, EXTENDED RELEASE ORAL at 21:23

## 2017-01-01 RX ADMIN — MORPHINE SULFATE 2 MILLIGRAM(S): 50 CAPSULE, EXTENDED RELEASE ORAL at 13:56

## 2017-01-01 RX ADMIN — OXYCODONE HYDROCHLORIDE 10 MILLIGRAM(S): 5 TABLET ORAL at 13:47

## 2017-01-01 RX ADMIN — HEPARIN SODIUM 5000 UNIT(S): 5000 INJECTION INTRAVENOUS; SUBCUTANEOUS at 18:42

## 2017-01-01 RX ADMIN — PIPERACILLIN AND TAZOBACTAM 25 GRAM(S): 4; .5 INJECTION, POWDER, LYOPHILIZED, FOR SOLUTION INTRAVENOUS at 07:02

## 2017-01-01 RX ADMIN — MORPHINE SULFATE 2 MILLIGRAM(S): 50 CAPSULE, EXTENDED RELEASE ORAL at 00:01

## 2017-01-01 RX ADMIN — MORPHINE SULFATE 2 MILLIGRAM(S): 50 CAPSULE, EXTENDED RELEASE ORAL at 01:03

## 2017-01-01 RX ADMIN — Medication 250 MILLIGRAM(S): at 17:40

## 2017-01-01 RX ADMIN — Medication 1 PACKET(S): at 13:45

## 2017-01-01 RX ADMIN — Medication 50 MILLIGRAM(S): at 17:23

## 2017-01-01 RX ADMIN — MORPHINE SULFATE 2 MILLIGRAM(S): 50 CAPSULE, EXTENDED RELEASE ORAL at 00:02

## 2017-01-01 RX ADMIN — Medication 20 MILLIGRAM(S): at 07:40

## 2017-01-01 RX ADMIN — POLYETHYLENE GLYCOL 3350 17 GRAM(S): 17 POWDER, FOR SOLUTION ORAL at 11:33

## 2017-01-01 RX ADMIN — PIPERACILLIN AND TAZOBACTAM 200 GRAM(S): 4; .5 INJECTION, POWDER, LYOPHILIZED, FOR SOLUTION INTRAVENOUS at 23:30

## 2017-01-01 RX ADMIN — Medication 1 APPLICATION(S): at 06:05

## 2017-01-01 RX ADMIN — SODIUM CHLORIDE 50 MILLILITER(S): 9 INJECTION, SOLUTION INTRAVENOUS at 12:19

## 2017-01-01 RX ADMIN — Medication 50 MILLIGRAM(S): at 07:40

## 2017-01-01 RX ADMIN — SENNA PLUS 2 TABLET(S): 8.6 TABLET ORAL at 22:12

## 2017-01-01 RX ADMIN — PIPERACILLIN AND TAZOBACTAM 25 GRAM(S): 4; .5 INJECTION, POWDER, LYOPHILIZED, FOR SOLUTION INTRAVENOUS at 06:41

## 2017-01-01 RX ADMIN — MORPHINE SULFATE 2 MILLIGRAM(S): 50 CAPSULE, EXTENDED RELEASE ORAL at 17:30

## 2017-01-01 RX ADMIN — HEPARIN SODIUM 5000 UNIT(S): 5000 INJECTION INTRAVENOUS; SUBCUTANEOUS at 05:44

## 2017-01-01 RX ADMIN — PIPERACILLIN AND TAZOBACTAM 25 GRAM(S): 4; .5 INJECTION, POWDER, LYOPHILIZED, FOR SOLUTION INTRAVENOUS at 05:54

## 2017-01-01 RX ADMIN — HEPARIN SODIUM 5000 UNIT(S): 5000 INJECTION INTRAVENOUS; SUBCUTANEOUS at 06:40

## 2017-01-01 RX ADMIN — HEPARIN SODIUM 5000 UNIT(S): 5000 INJECTION INTRAVENOUS; SUBCUTANEOUS at 06:06

## 2017-01-04 NOTE — H&P ADULT. - PMH
Cerebrovascular accident (CVA), unspecified mechanism    Essential hypertension    HIV (human immunodeficiency virus infection)    Uterine leiomyoma, unspecified location

## 2017-01-04 NOTE — H&P ADULT. - RADIOLOGY RESULTS AND INTERPRETATION
CXR no infiltrate  CT abd /pelvis: A massive amount of retained fecal matter and a UTI a impacted   fecalith markedly distends and fills the sigmoid colon and rectum,   filling the pelvis and lower abdomen. There is a extrinsic displacement   of the wall of the other abdominal and pelvic structures. There is no   visible obstruction of the small large intestine. There is moderate severe right hydronephrosis.  venous doppler: neg DVT

## 2017-01-04 NOTE — H&P ADULT. - ASSESSMENT
70 year old bed bound HIV+ patient with fecal impaction and right hydronephrosis due to large fecal mass, no fever or leukocytosis.  Pt may require surgery. Will need to obtain medical records for previous workup and treatment.

## 2017-01-04 NOTE — ED PROVIDER NOTE - MEDICAL DECISION MAKING DETAILS
d/w dr. jarrett for admission. Surgery Dr. Crane consulted per request of Dr. jarrett. Dr. hart aware.

## 2017-01-04 NOTE — H&P ADULT. - NEGATIVE NEUROLOGICAL SYMPTOMS
no loss of consciousness/no syncope/no generalized seizures/no focal seizures/no headache/no tremors/no loss of sensation/no confusion

## 2017-01-04 NOTE — H&P ADULT. - RS GEN PE MLT RESP DETAILS PC
airway patent/no rhonchi/no intercostal retractions/respirations non-labored/no subcutaneous emphysema/no wheezes/breath sounds equal/good air movement/no rales/clear to auscultation bilaterally

## 2017-01-04 NOTE — ED ADULT NURSE NOTE - OBJECTIVE STATEMENT
received pt sitting on stretcher stated that he was upstairs for presurgical testing and the blood pressure was high sent here for evaluation

## 2017-01-04 NOTE — H&P ADULT. - HISTORY OF PRESENT ILLNESS
72 y/o female with HTN, CVA with trach and contractures, bed bound, HIV, fibroids, presents with abdominal distension for 4 days. Pt with BM yesterday but was loose. No fever. Pt also has had right leg swelling for 2 months,  xray report was negative.     No fever/chills. No photophobia/eye pain/changes in vision, No ear pain/sore throat/dysphagia, No chest pain/palpitations. No SOB/cough/stridor. + abdominal pain, no N/V/D, no black/bloody bm. No dysuria/frequency/discharge, No headache. No Dizziness.  No rash.  No numbness/tingling/weakness. 70 y/o female with HTN, CVA with trach and contractures, bed bound, HIV, fibroids, presents with abdominal distension for 4 days. Pt with BM yesterday but was loose. No fever. Pt also has had right leg swelling for 2 months, the xray report was negative. No fever/chills. No photophobia/eye pain/changes in vision, No ear pain/sore throat/dysphagia, No chest pain/palpitations. No SOB/cough/stridor. She has abdominal pain (described as discomfort) not localized, mild, no N/V/D, no black/bloody stools, but loose in consistency. No dysuria/frequency/discharge, No headache or dizziness. No numbness/tingling/weakness and no rash.  Pt unable to provide medication list, or whether on HIV medication.

## 2017-01-04 NOTE — ED PROVIDER NOTE - OBJECTIVE STATEMENT
70yo female with pmh CVA with trach, bed bound, HIV psh: fibroids, presents with abd distension x 4 days. Pt with BM yesterday. No fever. Pt also with rt leg swelling x 2 months, had xray report was neg.     No fever/chills. No photophobia/eye pain/changes in vision, No ear pain/sore throat/dysphagia, No chest pain/palpitations. No SOB/cough/stridor. + abdominal pain, no N/V/D, no black/bloody bm. No dysuria/frequency/discharge, No headache. No Dizziness.  No rash.  No numbness/tingling/weakness.

## 2017-01-05 NOTE — CONSULT NOTE ADULT - ASSESSMENT
The patient has fecal impaction - she needs enema and digital disimpaction
71 yr old female seen with

## 2017-01-05 NOTE — CONSULT NOTE ADULT - SUBJECTIVE AND OBJECTIVE BOX
Dr. Alvino Ma contact information [phone]    GENERAL SURGERY CONSULT NOTE    Patient is a 71y old  Female who presents with a chief complaint of Increasing abdominal distention with decreased bowel movements for several days. (2017 23:51)      HPI:  70 y/o female with HTN, CVA with trach and contractures, bed bound, HIV, fibroids, presents with abdominal distension for 4 days. Pt with BM yesterday but was loose. No fever. Pt also has had right leg swelling for 2 months, the xray report was negative. No fever/chills. No photophobia/eye pain/changes in vision, No ear pain/sore throat/dysphagia, No chest pain/palpitations. No SOB/cough/stridor. She has abdominal pain (described as discomfort) not localized, mild, no N/V/D, no black/bloody stools, but loose in consistency. No dysuria/frequency/discharge, No headache or dizziness. No numbness/tingling/weakness and no rash.  Pt unable to provide medication list, or whether on HIV medication. (2017 23:51)      PAST MEDICAL & SURGICAL HISTORY:  Uterine leiomyoma, unspecified location  HIV (human immunodeficiency virus infection)  Essential hypertension  Cerebrovascular accident (CVA), unspecified mechanism  HIV (human immunodeficiency virus infection)  H/O knee surgery      Review of Systems:    I have reviewed 9 systems with the patient and the only positive findings were     MEDICATIONS  (STANDING):  sodium chloride 0.9%. 1000milliLiter(s) IV Continuous <Continuous>  heparin  Injectable 5000Unit(s) SubCutaneous every 12 hours  pantoprazole  Injectable 40milliGRAM(s) IV Push daily      No Known Allergies      SOCIAL HISTORY          Smoking: Yes [ ]  No [x]   ______pk yrs          ETOH  Yes [ ]  No [x]  Social [ ]          DRUGS:  Yes [ ]  No [x]  if so what______________    FAMILY HISTORY:  No pertinent family history in first degree relatives      Vital Signs Last 24 Hrs  T(C): 37.1, Max: 37.1 ( @ 12:19)  T(F): 98.8, Max: 98.8 ( @ 07:15)  HR: 112 (96 - 112)  BP: 136/73 (106/72 - 136/73)  BP(mean): --  RR: 18 (18 - 20)  SpO2: 99% (98% - 99%)    Physical Exam:    General:  Appears stated age, no distress  Abdomen:  distlended but not tender - sigmoid colon felt with stool        LABS:                        11.3   9.8   )-----------( 392      ( 2017 14:46 )             32.3     2017 14:46    140    |  101    |  9      ----------------------------<  105    3.8     |  30     |  0.56     Ca    9.4        2017 14:46    TPro  8.0    /  Alb  2.4    /  TBili  0.4    /  DBili  x      /  AST  15     /  ALT  12     /  AlkPhos  111    2017 14:46    PT/INR - ( 2017 14:46 )   PT: 13.8 sec;   INR: 1.23 ratio         PTT - ( 2017 14:46 )  PTT:35.0 sec  Urinalysis Basic - ( 2017 06:27 )    Color: Yellow / Appearance: Clear / S.020 / pH: x  Gluc: x / Ketone: Large  / Bili: Negative / Urobili: 4 mg/dL   Blood: x / Protein: 100 mg/dL / Nitrite: Positive   Leuk Esterase: Moderate / RBC: x / WBC 6-10   Sq Epi: x / Non Sq Epi: Occasional / Bacteria: Moderate        RADIOLOGY & ADDITIONAL STUDIES:    1. A massive amount of retained fecal matter and a UTI a impacted   fecalith markedly distends and fills the sigmoid colon and rectum,   filling the pelvis and lower abdomen. There is a extrinsic displacement   of the wall of the other abdominal and pelvic structures. There isno   visible obstruction of the small large intestine.  2. An umbilical hernia is noted containing a prolapsed loop of colon   without entrapment. The intra-abdominal wall is markedly thinned.  3. There is moderate severe right hydronephrosis likely due to extrinsic   compression of the distal right ureter.  4. A severe scoliosis is noted.
HPI:  72 y/o female with HTN, CVA with trach and contractures, bed bound, HIV, fibroids, presents with abdominal distension for 4 days. Pt with BM yesterday but was loose. No fever. Pt also has had right leg swelling for 2 months, the xray report was negative. No fever/chills. No photophobia/eye pain/changes in vision, No ear pain/sore throat/dysphagia, No chest pain/palpitations. No SOB/cough/stridor. She has abdominal pain (described as discomfort) not localized, mild, no N/V/D, no black/bloody stools, but loose in consistency. No dysuria/frequency/discharge, No headache or dizziness. No numbness/tingling/weakness and no rash.  Pt unable to provide medication list, or whether on HIV medication. She denies any flank pain. No history of nephrolithiasis.    PAST MEDICAL & SURGICAL HISTORY:  Uterine leiomyoma, unspecified location  HIV (human immunodeficiency virus infection)  Essential hypertension  Cerebrovascular accident (CVA), unspecified mechanism  HIV (human immunodeficiency virus infection)  H/O knee surgery      REVIEW OF SYSTEMS:    General: no fever	    Gastrointestinal:	 no pain    Genitourinary:	no hematuria     Musculoskeletal: no bone pain       MEDICATIONS  (STANDING):  sodium chloride 0.9%. 1000milliLiter(s) IV Continuous <Continuous>  heparin  Injectable 5000Unit(s) SubCutaneous every 12 hours  pantoprazole  Injectable 40milliGRAM(s) IV Push daily  cefTRIAXone   IVPB  IV Intermittent     MEDICATIONS  (PRN):      Allergies    No Known Allergies    Intolerances        SOCIAL HISTORY:    FAMILY HISTORY:  No pertinent family history in first degree relatives      Vital Signs Last 24 Hrs  T(C): 36.7, Max: 37.6 ( @ 11:50)  T(F): 98, Max: 99.6 ( @ 11:50)  HR: 119 (96 - 119)  BP: 102/67 (102/67 - 136/73)  BP(mean): --  RR: 16 (16 - 20)  SpO2: 96% (95% - 99%)      PHYSICAL EXAM:    Constitutional: awake and responsive    Gastrointestinal: soft, ND, NT; reducible umbilical hernia    Genitourinary: 14 [place] bazzi; urine clear    Extremities: contracted      LABS:                        11.3   9.8   )-----------( 392      ( 2017 14:46 )             32.3     2017 14:46    140    |  101    |  9      ----------------------------<  105    3.8     |  30     |  0.56     Ca    9.4        2017 14:46    TPro  8.0    /  Alb  2.4    /  TBili  0.4    /  DBili  x      /  AST  15     /  ALT  12     /  AlkPhos  111    2017 14:46    PT/INR - ( 2017 14:46 )   PT: 13.8 sec;   INR: 1.23 ratio         PTT - ( 2017 14:46 )  PTT:35.0 sec  Urinalysis Basic - ( 2017 06:27 )    Color: Yellow / Appearance: Clear / S.020 / pH: x  Gluc: x / Ketone: Large  / Bili: Negative / Urobili: 4 mg/dL   Blood: x / Protein: 100 mg/dL / Nitrite: Positive   Leuk Esterase: Moderate / RBC: x / WBC 6-10   Sq Epi: x / Non Sq Epi: Occasional / Bacteria: Moderate        RADIOLOGY & ADDITIONAL STUDIES:
Infectious Diseases - Attending at Dr. Collazo    HPI:  70 y/o female with HTN, CVA with trach and contractures, bed bound, HIV, fibroids, presents with abdominal distension for 4 days. Pt with BM yesterday but was loose. No fever. Pt also has had right leg swelling for 2 months, the xray report was negative. No fever/chills. No photophobia/eye pain/changes in vision, No ear pain/sore throat/dysphagia, No chest pain/palpitations. No SOB/cough/stridor. She has abdominal pain (described as discomfort) not localized, mild, no N/V/D, no black/bloody stools, but loose in consistency. No dysuria/frequency/discharge, No headache or dizziness. No numbness/tingling/weakness and no rash.  Pt unable to provide medication list, or whether on HIV medication. (2017 23:51)  UA shows possible UTI ,unsure if pt symptomatic. CT shows fecolith.      PAST MEDICAL & SURGICAL HISTORY:  Uterine leiomyoma, unspecified location  HIV (human immunodeficiency virus infection)  Essential hypertension  Cerebrovascular accident (CVA), unspecified mechanism  HIV (human immunodeficiency virus infection)  H/O knee surgery      Allergies    No Known Allergies    Intolerances        FAMILY HISTORY:  No pertinent family history in first degree relatives      SOCIAL HISTORY: NO smoking, alcoholism    REVIEW OF SYSTEMS:    UTO as pt trached    MEDICATIONS  (STANDING):  sodium chloride 0.9%. 1000milliLiter(s) IV Continuous <Continuous>  heparin  Injectable 5000Unit(s) SubCutaneous every 12 hours  pantoprazole  Injectable 40milliGRAM(s) IV Push daily  cefTRIAXone   IVPB 1Gram(s) IV Intermittent once  cefTRIAXone   IVPB  IV Intermittent     MEDICATIONS  (PRN):      Vital Signs Last 24 Hrs  T(C): 37.1, Max: 37.1 (- @ 12:19)  T(F): 98.8, Max: 98.8 ( @ 07:15)  HR: 112 (96 - 112)  BP: 136/73 (106/72 - 136/73)  BP(mean): --  RR: 18 (18 - 20)  SpO2: 99% (98% - 99%)    PHYSICAL EXAM:    Constitutional: appears in slight distress, bed ridden  HEENT: PERRL  Neck: s/p trach  Respiratory: CTAB/L   Cardiovascular: S1 and S2,tachycardia  Gastrointestinal: BS+, no guarding, distenstion, diffuse tendereness  Extremities: No peripheral edema  Vascular: 2+ peripheral pulses  Skin: No rashes      LABS:                        11.3   9.8   )-----------( 392      ( 2017 14:46 )             32.3     2017 14:46    140    |  101    |  9      ----------------------------<  105    3.8     |  30     |  0.56     Ca    9.4        2017 14:46    TPro  8.0    /  Alb  2.4    /  TBili  0.4    /  DBili  x      /  AST  15     /  ALT  12     /  AlkPhos  111    2017 14:46    PT/INR - ( 2017 14:46 )   PT: 13.8 sec;   INR: 1.23 ratio         PTT - ( 2017 14:46 )  PTT:35.0 sec  Urinalysis Basic - ( 2017 06:27 )    Color: Yellow / Appearance: Clear / S.020 / pH: x  Gluc: x / Ketone: Large  / Bili: Negative / Urobili: 4 mg/dL   Blood: x / Protein: 100 mg/dL / Nitrite: Positive   Leuk Esterase: Moderate / RBC: x / WBC 6-10   Sq Epi: x / Non Sq Epi: Occasional / Bacteria: Moderate        MICROBIOLOGY:      RADIOLOGY & ADDITIONAL STUDIES:1. A massive amount of retained fecal matter and a UTI a impacted   fecalith markedly distends and fills the sigmoid colon and rectum,   filling the pelvis and lower abdomen. There is a extrinsic displacement   of the wall of the other abdominal and pelvic structures. There isno   visible obstruction of the small large intestine.  2. An umbilical hernia is noted containing a prolapsed loop of colon   without entrapment. The intra-abdominal wall is markedly thinned.  3. There is moderate severe right hydronephrosis likely due to extrinsic   compression of the distal right ureter.  4. A severe scoliosis is noted.

## 2017-01-05 NOTE — CONSULT NOTE ADULT - PROBLEM SELECTOR RECOMMENDATION 3
GI evaluation
vs bacteruria and pyuria. pt trached so not sure of symptoms.  On rocephin-cont  F/U on urine culture

## 2017-01-05 NOTE — CONSULT NOTE ADULT - PROBLEM SELECTOR RECOMMENDATION 9
Likely due to extrinsic compression. treat underlying etiology. Stent not indicated.
lenemas and digital disimpaction
plan for enema and digital disimpaction   Surgery on board

## 2017-01-05 NOTE — PROGRESS NOTE ADULT - SUBJECTIVE AND OBJECTIVE BOX
CHIEF COMPLAINT/INTERVAL HISTORY:    Patient is a 71y old  Female who presents with a chief complaint of Increasing abdominal distention with decreased bowel movements for several days. (2017 23:51)      HPI:  70 y/o female with HTN, CVA with trach and contractures, bed bound, HIV, fibroids, presents with abdominal distension for 4 days. Pt with BM yesterday but was loose. No fever. Pt also has had right leg swelling for 2 months, the xray report was negative. No fever/chills. No photophobia/eye pain/changes in vision, No ear pain/sore throat/dysphagia, No chest pain/palpitations. No SOB/cough/stridor. She has abdominal pain (described as discomfort) not localized, mild, no N/V/D, no black/bloody stools, but loose in consistency. No dysuria/frequency/discharge, No headache or dizziness. No numbness/tingling/weakness and no rash.  Pt unable to provide medication list, or whether on HIV medication. (2017 23:51)    Overnight issues  surgery consult appreciated  SUBJECTIVE & OBJECTIVE: Pt seen and examined at bedside.   ROS:  patient nonverbal   ICU Vital Signs Last 24 Hrs  T(C): 37.6, Max: 37.6 ( @ 11:50)  T(F): 99.6, Max: 99.6 ( @ 11:50)  HR: 105 (96 - 112)  BP: 124/69 (110/78 - 136/73)  BP(mean): --  ABP: --  ABP(mean): --  RR: 16 (16 - 20)  SpO2: 95% (95% - 99%)        MEDICATIONS  (STANDING):  sodium chloride 0.9%. 1000milliLiter(s) IV Continuous <Continuous>  heparin  Injectable 5000Unit(s) SubCutaneous every 12 hours  pantoprazole  Injectable 40milliGRAM(s) IV Push daily  cefTRIAXone   IVPB  IV Intermittent   sodium biphosphate Rectal Enema 1Enema Rectal once    MEDICATIONS  (PRN):        PHYSICAL EXAM:    GENERAL: NAD,chronically ill appearing  HEAD:  Atraumatic, Normocephalic  EYES: EOMI, PERRLA, conjunctiva and sclera clear  ENMT: trached  NECK: Supple, No JVD  NERVOUS SYSTEM:  Alert  Motor Strength 5/5 B/L upper and lower extremities; DTRs 2+ intact and symmetric  CHEST/LUNG: Clear to auscultation bilaterally; No rales, rhonchi, wheezing, or rubs  HEART: Regular rate and rhythm; No murmurs, rubs, or gallops  ABDOMEN: Soft, Nontender,moderately distended  EXTREMITIES: markedly deformed hands   LABS:                        11.3   9.8   )-----------( 392      ( 2017 14:46 )             32.3     2017 14:46    140    |  101    |  9      ----------------------------<  105    3.8     |  30     |  0.56     Ca    9.4        2017 14:46    TPro  8.0    /  Alb  2.4    /  TBili  0.4    /  DBili  x      /  AST  15     /  ALT  12     /  AlkPhos  111    2017 14:46    PT/INR - ( 2017 14:46 )   PT: 13.8 sec;   INR: 1.23 ratio         PTT - ( 2017 14:46 )  PTT:35.0 sec  Urinalysis Basic - ( 2017 06:27 )    Color: Yellow / Appearance: Clear / S.020 / pH: x  Gluc: x / Ketone: Large  / Bili: Negative / Urobili: 4 mg/dL   Blood: x / Protein: 100 mg/dL / Nitrite: Positive   Leuk Esterase: Moderate / RBC: x / WBC 6-10   Sq Epi: x / Non Sq Epi: Occasional / Bacteria: Moderate        CAPILLARY BLOOD GLUCOSE      RECENT CULTURES:      RADIOLOGY & ADDITIONAL TESTS:  Imaging Personally Reviewed:  [ ] YES      Consultant(s) Notes Reviewed:  [ ] YES     Care Discussed with [ ] Consultants [X ] Patient [ ] Family  [x ]    [x ]  Other; RN  HEALTH ISSUES - PROBLEM Dx:  S/P tracheoplasty: S/P tracheoplasty  UTI (urinary tract infection) with pyuria: UTI (urinary tract infection) with pyuria  HIV (human immunodeficiency virus infection): HIV (human immunodeficiency virus infection)  Cerebrovascular accident (CVA), unspecified mechanism: Cerebrovascular accident (CVA), unspecified mechanism  Hydronephrosis of right kidney: Hydronephrosis of right kidney  Fecal impaction of colon: Fecal impaction of colon        DVT/GI ppx  Discussed with pt @ bedside

## 2017-01-06 NOTE — SWALLOW BEDSIDE ASSESSMENT ADULT - ORAL PHASE
Decreased anterior-posterior movement of the bolus/Delayed oral transit time Delayed oral transit time Decreased anterior-posterior movement of the bolus/Lingual stasis

## 2017-01-06 NOTE — PROGRESS NOTE ADULT - SUBJECTIVE AND OBJECTIVE BOX
71y Female admitted with CONSTIPATION/ HYDRONEPHROSIS  ABCESS ON STOMACH      Patient seen and examined bedside.   She received a fleet enema at 3pm.    T(F): 97.8, Max: 98.2 (01-05 @ 23:32)  HR: 120 (107 - 120)  BP: 114/81 (113/69 - 123/61)  RR: 16 (16 - 17)  SpO2: 98% (97% - 100%)  Wt(kg): --  CAPILLARY BLOOD GLUCOSE    PHYSICAL EXAM:  General: NAD, nonverbal  HEENT: NCAT, EOMI, conjunctiva clear. +Tracheostomy  Chest: respirations nonlabored  Abdomen: soft, mildly distended  Extremities: contracted  : bazzi catheter indwelling draining cloudy urine  Rectal: large amount soft, pasty, brown, stool in bed. Digital rectal exam performed for fecal disimpaction -   Rectal vault is empty on exam. Patient tolerated well.    LABS:                        9.9    10.3  )-----------( 316      ( 06 Jan 2017 08:17 )             29.9     06 Jan 2017 08:17    148    |  110    |  10     ----------------------------<  78     3.6     |  25     |  0.45     Ca    9.0        06 Jan 2017 08:17      Impression: 71F PMH Uterine leiomyoma, HIV, Essential hypertension, CVA/bedbound and tracheostomy admitted with fecal impaction, constipation,   Right hydronephrosis 2/2 retained feces and extrinsic compression of R ureter.  +BMs s/p enema and empty rectal vault on exam    Plan:  -continue fleet enemas QID  -continue present medical management and supportive care  -continue Bazzi catheter and Rocephin  -will discuss with Dr Ma and Dr Gordon for further [facility]  Patient seen and examined bedside.   She received a fleet enema at 9am today.    T(F): 97, Max: 99.6 (01-05 @ 11:50)  HR: 110 (105 - 119)  BP: 113/69 (102/67 - 124/69)  RR: 17 (16 - 17)  SpO2: 98% (95% - 98%)    PHYSICAL EXAM:  General: NAD, nonverbal  HEENT: NCAT, EOMI, conjunctiva clear. +Tracheostomy  Chest: respirations nonlabored  Abdomen: soft, mildly distended  Extremities: contracted  : bazzi catheter indwelling draining cloudy urine  Rectal: large amount soft stool in bed. Digital rectal exam performed for fecal disimpation - Rectal vault is empty on exam. Patient tolerated well.    LABS:                        9.9    10.3  )-----------( 316      ( 06 Jan 2017 08:17 )             29.9     06 Jan 2017 08:17    148    |  110    |  10     ----------------------------<  78     3.6     |  25     |  0.45     Ca    9.0        06 Jan 2017 08:17    TPro  8.0    /  Alb  2.4    /  TBili  0.4    /  DBili  x      /  AST  15     /  ALT  12     /  AlkPhos  111    04 Jan 2017 14:46    PT/INR - ( 04 Jan 2017 14:46 )   PT: 13.8 sec;   INR: 1.23 ratio    PTT - ( 04 Jan 2017 14:46 )  PTT:35.0 sec    I&O's 1440/900cc    Impression: 71F PMH Uterine leiomyoma, HIV, Essential hypertension, CVA/bedbound and tracheostomy admitted with fecal impaction, constipation, Right hydronephrosis 2/2 retained feces and extrinsic compression of R ureter.  +BMs s/p enema and empty rectal vault on exam    Plan:  -continue fleet enemas QID  -continue present medical management and supportive care  -continue bazzi catheter and rocephin  -will discuss with Dr Ma and Dr Gordon

## 2017-01-06 NOTE — DIETITIAN INITIAL EVALUATION ADULT. - ETIOLOGY
decreased ability to consume sufficient energy , alteration in GI tract function increased demand for nutrients

## 2017-01-06 NOTE — PROGRESS NOTE ADULT - ASSESSMENT
no evidence for and colonic perforation or stercal ulcer - I personally did a digical rectal and disimpacted the lower aspect of the stool ball

## 2017-01-06 NOTE — PROGRESS NOTE ADULT - SUBJECTIVE AND OBJECTIVE BOX
Patient is a 71y old  Female who presents with a chief complaint of Increasing abdominal distention with decreased bowel movements for several days. (2017 23:51)      INTERVAL HPI / OVERNIGHT EVENTS: No new events    MEDICATIONS  (STANDING):  heparin  Injectable 5000Unit(s) SubCutaneous every 12 hours  pantoprazole  Injectable 40milliGRAM(s) IV Push daily  cefTRIAXone   IVPB 1Gram(s) IV Intermittent every 24 hours  cefTRIAXone   IVPB  IV Intermittent   sodium biphosphate Rectal Enema 1Enema Rectal four times a day before meals  sodium chloride 0.9%. 1000milliLiter(s) IV Continuous <Continuous>    MEDICATIONS  (PRN):      Vital Signs Last 24 Hrs  T(C): 36.6, Max: 36.8 ( @ 23:32)  T(F): 97.8, Max: 98.2 ( @ 23:32)  HR: 120 (107 - 120)  BP: 114/81 (113/69 - 123/61)  BP(mean): --  RR: 16 (16 - 17)  SpO2: 98% (97% - 100%)    PHYSICAL EXAM:      Respiratory: CTAB/L, s/p trach  Cardiovascular: S1 and S2, RRR, no M/G/R  Gastrointestinal: BS+, soft, NT/ND  Extremities: No peripheral edema  Vascular: 2+ peripheral pulses  Skin: No rashes  Pardo catheter present    LABS:                        9.9    10.3  )-----------( 316      ( 2017 08:17 )             29.9     2017 08:17    148    |  110    |  10     ----------------------------<  78     3.6     |  25     |  0.45     Ca    9.0        2017 08:17        Urinalysis Basic - ( 2017 06:27 )    Color: Yellow / Appearance: Clear / S.020 / pH: x  Gluc: x / Ketone: Large  / Bili: Negative / Urobili: 4 mg/dL   Blood: x / Protein: 100 mg/dL / Nitrite: Positive   Leuk Esterase: Moderate / RBC: x / WBC 6-10   Sq Epi: x / Non Sq Epi: Occasional / Bacteria: Moderate          MICROBIOLOGY:    Culture Results -   No growth  Gram Stain - --  Organism - --  Organism Identification - --  Specimen Source - .Urine Clean Catch (Midstream)                AFB:        RADIOLOGY & ADDITIONAL STUDIES:

## 2017-01-06 NOTE — DIETITIAN INITIAL EVALUATION ADULT. - ENERGY NEEDS
Height (cm): 165.1 (01-04)  Weight (kg): 49.9 (01-04)  BMI (kg/m2): 18.3 (01-04)  IBW: 125 lbs/ 56.6 kg % IBW: 83%, current BMI= 17.4(10-06 wt.)  wt. loss of 5.3 lbs/2.4 kg( 4% in 2 days) noted from 01-04- 01-06(s/p enemas & NPO status x 2 days)

## 2017-01-06 NOTE — CHART NOTE - NSCHARTNOTEFT_GEN_A_CORE
Upon Nutritional Assessment by the Registered Dietitian your patient was determined to meet criteria / has evidence of the following diagnosis/diagnoses:          [ ]  Mild Protein Calorie Malnutrition        [ ]  Moderate Protein Calorie Malnutrition        [ ] Severe Protein Calorie Malnutrition        [ ] Unspecified Protein Calorie Malnutrition        [x ] Underweight / BMI <19        [ ] Morbid Obesity / BMI > 40      Findings as based on:  •  Comprehensive nutrition assessment and consultation  •  Calorie counts (nutrient intake analysis)  •  Food acceptance and intake status from observations by staff  •  Follow up  •  Patient education  •  Intervention secondary to interdisciplinary rounds  •   concerns      Treatment:    The following diet has been recommended:  Dysphagia 1 Pureed - Nectar Consistency Fluid , Ensure Pudding 4 oz 3x/day (510 roni, 12 gm pro) , No Carb Prosource 2 pkg daily=120 calories, 30 grams protein       PROVIDER Section:     By signing this assessment you are acknowledging and agree with the diagnosis/diagnoses assigned by the Registered Dietitian    Comments:

## 2017-01-06 NOTE — SWALLOW BEDSIDE ASSESSMENT ADULT - PHARYNGEAL PHASE
Multiple swallows/Delayed pharyngeal swallow Delayed pharyngeal swallow/Multiple swallows/Decreased laryngeal elevation

## 2017-01-06 NOTE — PROGRESS NOTE ADULT - SUBJECTIVE AND OBJECTIVE BOX
CHIEF COMPLAINT/INTERVAL HISTORY:    Patient is a 71y old  Female who presents with a chief complaint of Increasing abdominal distention with decreased bowel movements for several days. (2017 23:51)      HPI:  72 y/o female with HTN, CVA with trach and contractures, bed bound, HIV, fibroids, presents with abdominal distension for 4 days. Pt with BM yesterday but was loose. No fever. Pt also has had right leg swelling for 2 months, the xray report was negative. No fever/chills. No photophobia/eye pain/changes in vision, No ear pain/sore throat/dysphagia, No chest pain/palpitations. No SOB/cough/stridor. She has abdominal pain (described as discomfort) not localized, mild, no N/V/D, no black/bloody stools, but loose in consistency. No dysuria/frequency/discharge, No headache or dizziness. No numbness/tingling/weakness and no rash.  Pt unable to provide medication list, or whether on HIV medication. (2017 23:51)    Overnight issues  patient had a digital disempaction with copious amount  of feces evacuated           SUBJECTIVE & OBJECTIVE: Pt seen and examined at bedside.   ROS:  CONSTITUTIONAL: No fever, weight loss, or fatigue  EYES: No eye pain, visual disturbances, or discharge  ENMT:  No difficulty hearing, tinnitus, vertigo; No sinus or throat pain  NECK: No pain or stiffness  RESPIRATORY: No cough, wheezing, chills or hemoptysis; No shortness of breath  CARDIOVASCULAR: No chest pain, palpitations, dizziness, or leg swelling  GASTROINTESTINAL: No abdominal or epigastric pain. No nausea, vomiting, or hematemesis; No diarrhea or constipation. No melena or hematochezia.  GENITOURINARY: No dysuria, frequency, hematuria, or incontinence  NEUROLOGICAL: No headaches, memory loss, loss of strength, numbness, or tremors  SKIN: No itching, burning, rashes, or lesions   LYMPH NODES: No enlarged glands  ENDOCRINE: No heat or cold intolerance; No hair loss  MUSCULOSKELETAL: No joint pain or swelling; No muscle, back, or extremity pain  PSYCHIATRIC: No depression, anxiety, mood swings, or difficulty sleeping  HEME/LYMPH: No easy bruising, or bleeding gums  ALLERGY AND IMMUNOLOGIC: No hives or eczema  ICU Vital Signs Last 24 Hrs  T(C): 36.1, Max: 36.8 ( @ 23:32)  T(F): 97, Max: 98.2 ( @ 23:32)  HR: 107 (107 - 119)  BP: 116/62 (102/67 - 123/61)  BP(mean): --  ABP: --  ABP(mean): --  RR: 16 (16 - 17)  SpO2: 100% (96% - 100%)        MEDICATIONS  (STANDING):  sodium chloride 0.9%. 1000milliLiter(s) IV Continuous <Continuous>  heparin  Injectable 5000Unit(s) SubCutaneous every 12 hours  pantoprazole  Injectable 40milliGRAM(s) IV Push daily  cefTRIAXone   IVPB 1Gram(s) IV Intermittent every 24 hours  cefTRIAXone   IVPB  IV Intermittent   sodium biphosphate Rectal Enema 1Enema Rectal four times a day before meals    MEDICATIONS  (PRN):        PHYSICAL EXAM:    GENERAL: NAD, well-groomed, well-developed  HEAD:  Atraumatic, Normocephalic  EYES: EOMI, PERRLA, conjunctiva and sclera clear  ENMT: Moist mucous membranes  NECK: Supple, No JVD  NERVOUS SYSTEM:  Alert & Oriented X3, Motor Strength 5/5 B/L upper and lower extremities; DTRs 2+ intact and symmetric  CHEST/LUNG: Clear to auscultation bilaterally; No rales, rhonchi, wheezing, or rubs  HEART: Regular rate and rhythm; No murmurs, rubs, or gallops  ABDOMEN: Soft, Nontender, Nondistended; Bowel sounds present  EXTREMITIES:  2+ Peripheral Pulses, No clubbing, cyanosis, or edema    LABS:                        9.9    10.3  )-----------( 316      ( 2017 08:17 )             29.9     2017 08:17    148    |  110    |  10     ----------------------------<  78     3.6     |  25     |  0.45     Ca    9.0        2017 08:17    TPro  8.0    /  Alb  2.4    /  TBili  0.4    /  DBili  x      /  AST  15     /  ALT  12     /  AlkPhos  111    2017 14:46    PT/INR - ( 2017 14:46 )   PT: 13.8 sec;   INR: 1.23 ratio         PTT - ( 2017 14:46 )  PTT:35.0 sec  Urinalysis Basic - ( 2017 06:27 )    Color: Yellow / Appearance: Clear / S.020 / pH: x  Gluc: x / Ketone: Large  / Bili: Negative / Urobili: 4 mg/dL   Blood: x / Protein: 100 mg/dL / Nitrite: Positive   Leuk Esterase: Moderate / RBC: x / WBC 6-10   Sq Epi: x / Non Sq Epi: Occasional / Bacteria: Moderate        CAPILLARY BLOOD GLUCOSE      RECENT CULTURES:      RADIOLOGY & ADDITIONAL TESTS:  Imaging Personally Reviewed:  [ ] YES      Consultant(s) Notes Reviewed:  [ ] YES     Care Discussed with [ ] Consultants [X ] Patient [ ] Family  [x ]    [x ]  Other; RN  HEALTH ISSUES - PROBLEM Dx:  S/P tracheoplasty: S/P tracheoplasty  UTI (urinary tract infection) with pyuria: UTI (urinary tract infection) with pyuria  HIV (human immunodeficiency virus infection): HIV (human immunodeficiency virus infection)  Cerebrovascular accident (CVA), unspecified mechanism: Cerebrovascular accident (CVA), unspecified mechanism  Hydronephrosis of right kidney: Hydronephrosis of right kidney  Fecal impaction of colon: Fecal impaction of colon        DVT/GI ppx  Discussed with pt @ bedside

## 2017-01-06 NOTE — DIETITIAN INITIAL EVALUATION ADULT. - PERTINENT LABORATORY DATA
01-06 Na148 mmol/L<H> Glu 78 mg/dL K+ 3.6 mmol/L Cr  0.45 mg/dL<L> BUN 10 mg/dL EstGFR (AA)115 mL/min/1.73M2 EstGFR 99 mL/min/1.73M2 Phos n/a   Ca 9.0 mg/dL Alb n/a   PAB n/a   BNP n/a   Hgb 9.9 g/dL<L> Hct 29.9 %<L> 01-04 Alb 2.4 g/dL Glucose 105 mg/dL

## 2017-01-06 NOTE — DIETITIAN INITIAL EVALUATION ADULT. - SOURCE
other (specify)/patient/Chart review , pt. c tracheostomy, mouths words, unable to obtain detailed diet hx

## 2017-01-06 NOTE — DIETITIAN INITIAL EVALUATION ADULT. - PHYSICAL APPEARANCE
underweight/debilitated/other (specify)/BMI= 17.4 (01-06) physical findings c increased prominence of clavicle noted, somewhat hollow look of orbital region

## 2017-01-06 NOTE — DIETITIAN INITIAL EVALUATION ADULT. - SIGNS/SYMPTOMS
estimated intake from diet is less than needs c fecal impaction, NPO x 2 days c BMI= 17.4 pressure ulcers

## 2017-01-06 NOTE — PROGRESS NOTE ADULT - SUBJECTIVE AND OBJECTIVE BOX
Patient seen and examined bedside.   She received a fleet enema at 9am today.    T(F): 97, Max: 99.6 (01-05 @ 11:50)  HR: 110 (105 - 119)  BP: 113/69 (102/67 - 124/69)  RR: 17 (16 - 17)  SpO2: 98% (95% - 98%)    PHYSICAL EXAM:  General: NAD, nonverbal  HEENT: NCAT, EOMI, conjunctiva clear. +Tracheostomy  Chest: respirations nonlabored  Abdomen: soft, mildly distended  Extremities: contracted  : bazzi catheter indwelling draining cloudy urine  Rectal: large amount soft stool in bed. Digital rectal exam performed for fecal disimpation - Rectal vault is empty on exam. Patient tolerated well.    LABS:                        9.9    10.3  )-----------( 316      ( 06 Jan 2017 08:17 )             29.9     06 Jan 2017 08:17    148    |  110    |  10     ----------------------------<  78     3.6     |  25     |  0.45     Ca    9.0        06 Jan 2017 08:17    TPro  8.0    /  Alb  2.4    /  TBili  0.4    /  DBili  x      /  AST  15     /  ALT  12     /  AlkPhos  111    04 Jan 2017 14:46    PT/INR - ( 04 Jan 2017 14:46 )   PT: 13.8 sec;   INR: 1.23 ratio    PTT - ( 04 Jan 2017 14:46 )  PTT:35.0 sec    I&O's 1440/900cc    Impression: 71F PMH Uterine leiomyoma, HIV, Essential hypertension, CVA/bedbound and tracheostomy admitted with fecal impaction, constipation, Right hydronephrosis 2/2 retained feces and extrinsic compression of R ureter.  +BMs s/p enema and empty rectal vault on exam    Plan:  -continue fleet enemas QID  -continue present medical management and supportive care  -continue bazzi catheter and rocephin  -will discuss with Dr Ma and Dr Gordon Patient seen and examined bedside.   She received a fleet enema at 9am today.    T(F): 97, Max: 99.6 (01-05 @ 11:50)  HR: 110 (105 - 119)  BP: 113/69 (102/67 - 124/69)  RR: 17 (16 - 17)  SpO2: 98% (95% - 98%)    PHYSICAL EXAM:  General: NAD, nonverbal  HEENT: NCAT, EOMI, conjunctiva clear. +Tracheostomy  Chest: respirations nonlabored  Abdomen: soft, mildly distended  Extremities: contracted  : bazzi catheter indwelling draining cloudy urine  Rectal: large amount soft stool in bed. Digital rectal exam performed for fecal disimpaction - Rectal vault is empty on exam. Patient tolerated well.    LABS:                        9.9    10.3  )-----------( 316      ( 06 Jan 2017 08:17 )             29.9     06 Jan 2017 08:17    148    |  110    |  10     ----------------------------<  78     3.6     |  25     |  0.45     Ca    9.0        06 Jan 2017 08:17    TPro  8.0    /  Alb  2.4    /  TBili  0.4    /  DBili  x      /  AST  15     /  ALT  12     /  AlkPhos  111    04 Jan 2017 14:46    PT/INR - ( 04 Jan 2017 14:46 )   PT: 13.8 sec;   INR: 1.23 ratio    PTT - ( 04 Jan 2017 14:46 )  PTT:35.0 sec    I&O's 1440/900cc    Impression: 71F PMH Uterine leiomyoma, HIV, Essential hypertension, CVA/bedbound and tracheostomy admitted with fecal impaction, constipation, Right hydronephrosis 2/2 retained feces and extrinsic compression of R ureter.  +BMs s/p enema and empty rectal vault on exam    Plan:  -continue fleet enemas QID  -continue present medical management and supportive care  -continue bazzi catheter and rocephin  -will discuss with Dr Ma and Dr Gordon

## 2017-01-06 NOTE — DIETITIAN INITIAL EVALUATION ADULT. - FACTORS AFF FOOD INTAKE
difficulty swallowing/pt. is edentulous, swallow evaluation recommended puree diet c nectar thick liquids/difficulty chewing/persistent constipation

## 2017-01-06 NOTE — DIETITIAN INITIAL EVALUATION ADULT. - OTHER INFO
Pt. was seen due to RN consult for BMI=18.3.  Pt. unsure of wt. hx.  Pt. has been NPO since adm & diet progressed at present.

## 2017-01-07 NOTE — PROGRESS NOTE ADULT - SUBJECTIVE AND OBJECTIVE BOX
Surgery Attending    Remains afebrile  Pulse ~120   / 70  No apparent c/o abdominal pain  BM x 4 after Fleet enemas    Abdomen:  Soft, +reducible umbilical hernia. Very large, hard stool mass palpable in markedly dilated rectosigmoid.    Plan:  Despite good response thus far to enemas, pt still has large stool mass in rectosigmoid visible on abd Xray and palpable on exam. For now would continue w enemas but likely will need further manual disimpaction, possibly under anesthesia.

## 2017-01-07 NOTE — PROGRESS NOTE ADULT - PROBLEM SELECTOR PLAN 1
- will obtain AXR  - enemas PRN  - status post digital disimpaction  - no evidence for and colonic perforation or stercal ulcer per surgery

## 2017-01-07 NOTE — PROGRESS NOTE ADULT - ASSESSMENT
70 year old bed bound HIV+ patient with fecal impaction and right hydronephrosis due to large fecal mass, no fever or leukocytosis.

## 2017-01-07 NOTE — PROGRESS NOTE ADULT - SUBJECTIVE AND OBJECTIVE BOX
Pt seen at bedside for second manual disimpaction post enema today. Pt still with hard stool that is unable to remove with digits. Pt c/o severe pain during procedure. Will discuss with attending possible disimpaction under anesthesia, will continue with enema.

## 2017-01-07 NOTE — PROGRESS NOTE ADULT - SUBJECTIVE AND OBJECTIVE BOX
Patient is a 71y old  Female who presents with a chief complaint of Increasing abdominal distention with decreased bowel movements for several days. (04 Jan 2017 23:51)      OVERNIGHT EVENTS: no overnight event     REVIEW OF SYSTEMS: unable to provide due to Tracheostomy. But not in acute distress or pain. Had 2 large bm last night after enema. No vomiting/SOB, no fever/chills     MEDICATIONS  (STANDING):  heparin  Injectable 5000Unit(s) SubCutaneous every 12 hours  pantoprazole  Injectable 40milliGRAM(s) IV Push daily  cefTRIAXone   IVPB 1Gram(s) IV Intermittent every 24 hours  cefTRIAXone   IVPB  IV Intermittent   sodium biphosphate Rectal Enema 1Enema Rectal four times a day before meals  sodium chloride 0.9%. 1000milliLiter(s) IV Continuous <Continuous>    MEDICATIONS  (PRN):      Allergies    No Known Allergies    Intolerances        T(F): 97.6, Max: 97.8 (01-06 @ 17:12)  HR: 116 (107 - 122)  BP: 135/69 (114/81 - 137/83)  RR: 18 (16 - 18)  SpO2: 98% (96% - 100%)  Wt(kg): --    PHYSICAL EXAM:  GENERAL: NAD, well-groomed, well-developed, thin  HEAD:  Atraumatic, Normocephalic  EYES: EOMI, PERRLA, conjunctiva and sclera clear  ENMT: Tracheostomy  NECK: Supple, No JVD, Normal thyroid  NERVOUS SYSTEM:  awake, Alert, Good concentration; functional quadriplegia,  deformed hands, follow simple commands  CHEST/LUNG: Clear to percussion bilaterally; No rales, rhonchi, wheezing, or rubs BL  HEART: Regular rate and rhythm; No murmurs, rubs, or gallops  ABDOMEN: Soft, Nontender, Nondistended; Bowel sounds present, Pardo catheter indwelling draining cloudy urine  EXTREMITIES:  2+ Peripheral Pulses, No clubbing, cyanosis, or edema BL LE  LYMPH: No lymphadenopathy noted  SKIN: multiple decubiti    LABS:                        9.9    10.3  )-----------( 316      ( 06 Jan 2017 08:17 )             29.9     06 Jan 2017 08:17    148    |  110    |  10     ----------------------------<  78     3.6     |  25     |  0.45     Ca    9.0        06 Jan 2017 08:17          Cultures;   CAPILLARY BLOOD GLUCOSE    Lipid panel:           RADIOLOGY & ADDITIONAL TESTS:    Imaging Personally Reviewed:  [ x] YES      Consultant(s) Notes Reviewed:  [x ] YES     Care Discussed with [x ] Consultants [X ] Patient [ ] Family  [x ]    [x ]  Other; RN

## 2017-01-08 NOTE — PROGRESS NOTE ADULT - ASSESSMENT
I did a rectal examination and found that the rectal canal was filled with firm but not rock hard stool disimpaction was done. Plan will be to give the patient some GoLYTELY from above. I did a rectal examination and found that the rectal canal was filled with firm but not rock hard stool disimpaction was done. Plan will be to give the patient some GoLYTELY from above.  We will continue to do rectal exam.

## 2017-01-08 NOTE — CHART NOTE - NSCHARTNOTEFT_GEN_A_CORE
Called by RN for manual fecal disimpaction post enema. Brown, soft stool noted on bedsheets. Rectal vault empty on exam. Patient tolerated well. Will continue with enemas and rectal exams.

## 2017-01-08 NOTE — PROGRESS NOTE ADULT - SUBJECTIVE AND OBJECTIVE BOX
Dr. Alvino Ma contact information [phone]      Subjective: had multiple bowel movements yesterday    Vital Signs Last 24 Hrs  T(C): 36.7, Max: 36.7 (01-08 @ 00:00)  T(F): 98, Max: 98 (01-08 @ 00:00)  HR: 102 (102 - 120)  BP: 131/66 (131/66 - 145/75)  BP(mean): --  RR: 18 (18 - 18)  SpO2: 97% (97% - 100%)                         9.4    8.7   )-----------( 324      ( 07 Jan 2017 08:40 )             28.1       07 Jan 2017 08:40    149    |  112    |  7      ----------------------------<  85     3.0     |  26     |  0.51     Ca    8.8        07 Jan 2017 08:40      Physical Exam:    Abdomen:  abdomen is soft and nontender

## 2017-01-08 NOTE — CHART NOTE - NSCHARTNOTEFT_GEN_A_CORE
Patient seen at bedside for manual disimpaction post enema. Large amounts of soft, brown stool on bedsheets. Rectal vault with firm stool; disimpaction done. Patient tolerated well. Will continue with enemas and rectal exams.

## 2017-01-08 NOTE — PROGRESS NOTE ADULT - SUBJECTIVE AND OBJECTIVE BOX
Patient is a 71y old  Female who presents with a chief complaint of Increasing abdominal distention with decreased bowel movements for several days. (04 Jan 2017 23:51)      OVERNIGHT EVENTS: Attempt made for second manual disimpaction yesterday by surgery. likely will need further manual disimpaction, possibly under anesthesia. Despite good response thus far to enemas, pt still has large stool mass in rectosigmoid visible on abd Xray     REVIEW OF SYSTEMS: no chest pain/SOB, diaphoresis, no F/C, cough, dizziness, headache, blurry vision, nausea, vomiting, abdominal pain. Rest unremarkable     MEDICATIONS  (STANDING):  heparin  Injectable 5000Unit(s) SubCutaneous every 12 hours  pantoprazole  Injectable 40milliGRAM(s) IV Push daily  cefTRIAXone   IVPB 1Gram(s) IV Intermittent every 24 hours  cefTRIAXone   IVPB  IV Intermittent   sodium biphosphate Rectal Enema 1Enema Rectal four times a day before meals  dextrose 5% + sodium chloride 0.45%. 1000milliLiter(s) IV Continuous <Continuous>  polyethylene glycol/electrolyte Solution. 2000milliLiter(s) Oral once    MEDICATIONS  (PRN):      Allergies    No Known Allergies    Intolerances    T(F): 98, Max: 98 (01-08 @ 00:00)  HR: 102 (102 - 120)  BP: 131/66 (131/66 - 145/75)  RR: 18 (18 - 18)  SpO2: 97% (97% - 100%)  Wt(kg): --    PHYSICAL EXAM:  GENERAL: NAD, well-groomed, well-developed, thin  HEAD:  Atraumatic, Normocephalic  EYES: EOMI, PERRLA, conjunctiva and sclera clear  ENMT: Tracheostomy  NECK: Supple, No JVD, Normal thyroid  NERVOUS SYSTEM:  awake, Alert, Good concentration; functional quadriplegia,  deformed hands, follow simple commands  CHEST/LUNG: Clear to percussion bilaterally; No rales, rhonchi, wheezing, or rubs BL  HEART: Regular rate and rhythm; No murmurs, rubs, or gallops  ABDOMEN: Soft, Nontender, Nondistended; Bowel sounds present, Pardo catheter indwelling draining cloudy urine  EXTREMITIES:  2+ Peripheral Pulses, No clubbing, cyanosis, or edema BL LE  LYMPH: No lymphadenopathy noted  SKIN: multiple decubiti      LABS:                        9.4    8.7   )-----------( 324      ( 07 Jan 2017 08:40 )             28.1     07 Jan 2017 08:40    149    |  112    |  7      ----------------------------<  85     3.0     |  26     |  0.51     Ca    8.8        07 Jan 2017 08:40          Cultures;   CAPILLARY BLOOD GLUCOSE    Lipid panel:       RADIOLOGY & ADDITIONAL TESTS:    Imaging Personally Reviewed:  [x ] YES      Consultant(s) Notes Reviewed:  [x ] YES     Care Discussed with [x ] Consultants [X ] Patient [ ] Family  [x ]    [x ]  Other; RN

## 2017-01-08 NOTE — PROGRESS NOTE ADULT - PROBLEM SELECTOR PLAN 1
- manual disimpaction and enemas PRN  - no evidence for and colonic perforation or stercal ulcer per surgery  - Cont on IVF, monitor for any pain

## 2017-01-09 NOTE — PROGRESS NOTE ADULT - PROBLEM SELECTOR PLAN 5
- worsening  - change IV to D5/0.45% NS  - trend bmp resolved on iv fluid, continue iv hydration for now

## 2017-01-09 NOTE — PROGRESS NOTE ADULT - PROBLEM SELECTOR PLAN 1
- manual disimpaction and enemas PRN  2 liters of go lytelty given yesterday.  - no evidence for and colonic perforation or stercal ulcer per surgery  - Cont on IVF, monitor for any pain

## 2017-01-09 NOTE — CHART NOTE - NSCHARTNOTEFT_GEN_A_CORE
Called By Rn for manaul fecal disimpaction post fleet enema. Brown soft stool noted on bedsheet. Rectal vault full of soft brown stool. Patient tolerated well. Continue enemas and rectal exams as per surgery orders.  [facility]

## 2017-01-09 NOTE — CHART NOTE - NSCHARTNOTEFT_GEN_A_CORE
Labs reviewed. Hypokalemia noted, supplemented IV/PO this am.  Bowel regimen added, colace/senna/miralax.   Patient has been having soft stools. Continue enemas as ordered. GI follow up noted, oil retention enema.   KUB results from this afternoon reviewed. Will discuss with surgical attending. Continue present supportive care.

## 2017-01-09 NOTE — PROGRESS NOTE ADULT - SUBJECTIVE AND OBJECTIVE BOX
Patient is a 71y old  Female who presents with a chief complaint of Increasing abdominal distention with decreased bowel movements for several days. (04 Jan 2017 23:51)      INTERVAL HPI/OVERNIGHT EVENTS:    MEDICATIONS  (STANDING):  heparin  Injectable 5000Unit(s) SubCutaneous every 12 hours  pantoprazole  Injectable 40milliGRAM(s) IV Push daily  cefTRIAXone   IVPB 1Gram(s) IV Intermittent every 24 hours  cefTRIAXone   IVPB  IV Intermittent   sodium biphosphate Rectal Enema 1Enema Rectal four times a day before meals  dextrose 5% + sodium chloride 0.45%. 1000milliLiter(s) IV Continuous <Continuous>  metoprolol 50milliGRAM(s) Oral two times a day  baclofen 20milliGRAM(s) Oral two times a day  senna 2Tablet(s) Oral at bedtime  docusate sodium 100milliGRAM(s) Oral three times a day  potassium chloride  10 mEq/100 mL IVPB 10milliEquivalent(s) IV Intermittent every 1 hour  polyethylene glycol 3350 17Gram(s) Oral daily  potassium phosphate IVPB 15milliMole(s) IV Intermittent once  potassium chloride    Tablet ER 40milliEquivalent(s) Oral once    MEDICATIONS  (PRN):      Allergies    No Known Allergies    Intolerances        REVIEW OF SYSTEMS:  CONSTITUTIONAL: No fever, weight loss, or fatigue  EYES: No eye pain, visual disturbances, or discharge  ENMT:  No difficulty hearing, tinnitus, vertigo; No sinus or throat pain  NECK: No pain or stiffness  BREASTS: No pain, masses, or nipple discharge  RESPIRATORY: No cough, wheezing, chills or hemoptysis; No shortness of breath  CARDIOVASCULAR: No chest pain, palpitations, dizziness, or leg swelling  GASTROINTESTINAL: No abdominal or epigastric pain. No nausea, vomiting, or hematemesis; No diarrhea or constipation. No melena or hematochezia.  GENITOURINARY: No dysuria, frequency, hematuria, or incontinence  NEUROLOGICAL: No headaches, memory loss, loss of strength, numbness, or tremors  SKIN: No itching, burning, rashes, or lesions   LYMPH NODES: No enlarged glands  ENDOCRINE: No heat or cold intolerance; No hair loss  MUSCULOSKELETAL: No joint pain or swelling; No muscle, back, or extremity pain  PSYCHIATRIC: No depression, anxiety, mood swings, or difficulty sleeping  HEME/LYMPH: No easy bruising, or bleeding gums  ALLERGY AND IMMUNOLOGIC: No hives or eczema    Vital Signs Last 24 Hrs  T(C): 36.7, Max: 37.1 (01-08 @ 23:14)  T(F): 98.1, Max: 98.8 (01-08 @ 23:14)  HR: 109 (108 - 131)  BP: 133/74 (122/54 - 133/74)  BP(mean): --  RR: 17 (17 - 18)  SpO2: 96% (94% - 99%)    PHYSICAL EXAM:  GENERAL: NAD, well-groomed, well-developed  HEAD:  Atraumatic, Normocephalic  EYES: EOMI, PERRLA, conjunctiva and sclera clear  ENMT: No tonsillar erythema, exudates, or enlargement; Moist mucous membranes, Good dentition, No lesions  NECK: Supple, No JVD, Normal thyroid  NERVOUS SYSTEM:  Alert & Oriented X3, Good concentration; Motor Strength 5/5 B/L upper and lower extremities; DTRs 2+ intact and symmetric  CHEST/LUNG: Clear to percussion bilaterally; No rales, rhonchi, wheezing, or rubs  HEART: Regular rate and rhythm; No murmurs, rubs, or gallops  ABDOMEN: Soft, Nontender, Nondistended; Bowel sounds present  EXTREMITIES:  2+ Peripheral Pulses, No clubbing, cyanosis, or edema  LYMPH: No lymphadenopathy noted  SKIN: No rashes or lesions    LABS:                        9.9    9.6   )-----------( 307      ( 09 Jan 2017 06:50 )             28.6     09 Jan 2017 06:50    145    |  107    |  4      ----------------------------<  117    2.6     |  30     |  0.45     Ca    8.3        09 Jan 2017 06:50  Phos  1.9       08 Jan 2017 10:46  Mg     1.8       09 Jan 2017 06:50          CAPILLARY BLOOD GLUCOSE      RADIOLOGY & ADDITIONAL TESTS:    Imaging Personally Reviewed:  [ ] YES  [ ] NO    Consultant(s) Notes Reviewed:  [ ] YES  [ ] NO    Care Discussed with Consultants/Other Providers [ ] YES  [ ] NO Patient is a 71y old  Female who presents with a chief complaint of Increasing abdominal distention with decreased bowel movements for several days. (04 Jan 2017 23:51)      INTERVAL HPI/OVERNIGHT EVENTS:  as pe rn has been having bm with enema.  patient alert, eating breakfast with assist.  Denies abdominal pain or discomfort.    MEDICATIONS  (STANDING):  heparin  Injectable 5000Unit(s) SubCutaneous every 12 hours  pantoprazole  Injectable 40milliGRAM(s) IV Push daily  cefTRIAXone   IVPB 1Gram(s) IV Intermittent every 24 hours  cefTRIAXone   IVPB  IV Intermittent   sodium biphosphate Rectal Enema 1Enema Rectal four times a day before meals  dextrose 5% + sodium chloride 0.45%. 1000milliLiter(s) IV Continuous <Continuous>  metoprolol 50milliGRAM(s) Oral two times a day  baclofen 20milliGRAM(s) Oral two times a day  senna 2Tablet(s) Oral at bedtime  docusate sodium 100milliGRAM(s) Oral three times a day  potassium chloride  10 mEq/100 mL IVPB 10milliEquivalent(s) IV Intermittent every 1 hour  polyethylene glycol 3350 17Gram(s) Oral daily  potassium phosphate IVPB 15milliMole(s) IV Intermittent once  potassium chloride    Tablet ER 40milliEquivalent(s) Oral once    MEDICATIONS  (PRN):      Allergies    No Known Allergies    Intolerances        REVIEW OF SYSTEMS: no chest pain/SOB, diaphoresis, no F/C, cough, dizziness, headache, blurry vision, nausea, vomiting, abdominal pain. Rest unremarkable    Vital Signs Last 24 Hrs  T(C): 36.7, Max: 37.1 (01-08 @ 23:14)  T(F): 98.1, Max: 98.8 (01-08 @ 23:14)  HR: 109 (108 - 131)  BP: 133/74 (122/54 - 133/74)  BP(mean): --  RR: 17 (17 - 18)  SpO2: 96% (94% - 99%)    PHYSICAL EXAM:  GENERAL:  thin, chronically ill appearing, alert  HEAD:   Normocephalic, bilateral temporal wasting  EYES: EOMI, PERRLA, conjunctiva and sclera clear  ENMT: Tracheostomy  NECK: Supple, No JVD  NERVOUS SYSTEM:  awake, Alert, Good concentration; functional quadriplegia, contracted hands, follows simple commands  CHEST/LUNG: good air entry bilaterally coarse BS; No rales, rhonchi, wheezing, or rubs BL  HEART: Regular rate and rhythm; No murmurs, rubs, or gallops  ABDOMEN: Soft, Nontender, Nondistended; Bowel sounds present, Pardo catheter indwelling draining cloudy urine  EXTREMITIES:  2+ Peripheral Pulses, contracted BL LE  LYMPH: No cervical lymphadenopathy noted  SKIN: multiple decubiti    LABS:                        9.9    9.6   )-----------( 307      ( 09 Jan 2017 06:50 )             28.6     09 Jan 2017 06:50    145    |  107    |  4      ----------------------------<  117    2.6     |  30     |  0.45     Ca    8.3        09 Jan 2017 06:50  Phos  1.9       08 Jan 2017 10:46  Mg     1.8       09 Jan 2017 06:50          CAPILLARY BLOOD GLUCOSE      RADIOLOGY & ADDITIONAL TESTS:    Imaging Personally Reviewed:  [x ] YES  [ ] NO    Consultant(s) Notes Reviewed:  [ x] YES  [ ] NO    Care Discussed with Consultants/Other Providers [ x] YES  [ ] NO

## 2017-01-10 NOTE — PROGRESS NOTE ADULT - SUBJECTIVE AND OBJECTIVE BOX
Still c/o abdomonal pain - no significant BM      MEDICATIONS  (STANDING):  heparin  Injectable 5000Unit(s) SubCutaneous every 12 hours  pantoprazole  Injectable 40milliGRAM(s) IV Push daily  sodium biphosphate Rectal Enema 1Enema Rectal four times a day before meals  dextrose 5% + sodium chloride 0.45%. 1000milliLiter(s) IV Continuous <Continuous>  metoprolol 50milliGRAM(s) Oral two times a day  baclofen 20milliGRAM(s) Oral two times a day  senna 2Tablet(s) Oral at bedtime  docusate sodium 100milliGRAM(s) Oral three times a day  polyethylene glycol 3350 17Gram(s) Oral daily    MEDICATIONS  (PRN):      Allergies    No Known Allergies    Intolerances        Vital Signs Last 24 Hrs  T(C): 36.7, Max: 37.1 (01-09 @ 16:31)  T(F): 98, Max: 98.8 (01-09 @ 16:31)  HR: 98 (92 - 110)  BP: 151/91 (122/70 - 151/91)  BP(mean): --  RR: 18 (16 - 18)  SpO2: 98% (98% - 100%)    PHYSICAL EXAM:  General: NAD.  CVS: S1, S2  Chest: air entry bilaterally present  Abd: BS present, soft, non-tender, softly distended      LABS:                        9.9    9.6   )-----------( 307      ( 09 Jan 2017 06:50 )             28.6     10 Nawaf 2017 07:59    146    |  106    |  5      ----------------------------<  107    3.4     |  32     |  0.40     Ca    8.8        10 Nawaf 2017 07:59  Mg     1.8       09 Jan 2017 06:50          Follow labs  encourage Golytely  suggest magnesium citrate  refusing enemas at present.

## 2017-01-10 NOTE — PROGRESS NOTE ADULT - PROBLEM SELECTOR PLAN 4
? h/O HIV-pt s Vl undetectable and CD4 999,she says  no when I ask her if she knows she has HIV.Per nurse famiily never visited her.When I called the contact no once no one picked up the phone/Check HIV serology to confeirmt the status

## 2017-01-10 NOTE — PROGRESS NOTE ADULT - SUBJECTIVE AND OBJECTIVE BOX
Patient is a 71y old  Female who presents with a chief complaint of Increasing abdominal distention with decreased bowel movements for several days. (2017 23:51)      INTERVAL HPI / OVERNIGHT EVENTS: No new events,pt still has significant fecal impaction. She emery she has HIV ??    MEDICATIONS  (STANDING):  heparin  Injectable 5000Unit(s) SubCutaneous every 12 hours  pantoprazole  Injectable 40milliGRAM(s) IV Push daily  cefTRIAXone   IVPB 1Gram(s) IV Intermittent every 24 hours  cefTRIAXone   IVPB  IV Intermittent   sodium biphosphate Rectal Enema 1Enema Rectal four times a day before meals  sodium chloride 0.9%. 1000milliLiter(s) IV Continuous <Continuous>    MEDICATIONS  (PRN):      Vital Signs Last 24 Hrs  Tmax afebrile    PHYSICAL EXAM:    Respiratory: CTAB/L, s/p trach  Cardiovascular: S1 and S2, RRR, no M/G/R  Gastrointestinal: BS+, soft, NT/ND  Extremities: No peripheral edema  Vascular: 2+ peripheral pulses  Skin: No rashes  Pardo catheter present    LABS:  Cr 0.40        Urinalysis Basic - ( 2017 06:27 )    Color: Yellow / Appearance: Clear / S.020 / pH: x  Gluc: x / Ketone: Large  / Bili: Negative / Urobili: 4 mg/dL   Blood: x / Protein: 100 mg/dL / Nitrite: Positive   Leuk Esterase: Moderate / RBC: x / WBC 6-10   Sq Epi: x / Non Sq Epi: Occasional / Bacteria: Moderate          MICROBIOLOGY:    Culture Results -   No growth  Gram Stain - --  Organism - --  Organism Identification - --  Specimen Source - .Urine Clean Catch (Midstream)      CD4 999  Vl undetectable              AFB:        RADIOLOGY & ADDITIONAL STUDIES:

## 2017-01-10 NOTE — PROGRESS NOTE ADULT - ASSESSMENT
Unable to give Golytely as patient cannot take liquids - she is on Miralax which will be increased to TID

## 2017-01-10 NOTE — PROGRESS NOTE ADULT - PROBLEM SELECTOR PLAN 1
digital disimpaction done by surgeon but no Significant BM,pt still has significant stool impaction on repeat Imaging

## 2017-01-10 NOTE — PROGRESS NOTE ADULT - PROBLEM SELECTOR PLAN 1
- manual disimpaction and enemas PRN--patient currently refusing  to give 2 liters of go lytely (refused yesterday).  - no evidence for and colonic perforation or stereocoral ulcer per surgery  - Cont on IVF, monitor for any pain

## 2017-01-10 NOTE — PROGRESS NOTE ADULT - SUBJECTIVE AND OBJECTIVE BOX
Patient is a 71y old  Female who presents with a chief complaint of Increasing abdominal distention with decreased bowel movements for several days. (04 Jan 2017 23:51)      INTERVAL HPI/OVERNIGHT EVENTS:    MEDICATIONS  (STANDING):  heparin  Injectable 5000Unit(s) SubCutaneous every 12 hours  pantoprazole  Injectable 40milliGRAM(s) IV Push daily  sodium biphosphate Rectal Enema 1Enema Rectal four times a day before meals  dextrose 5% + sodium chloride 0.45%. 1000milliLiter(s) IV Continuous <Continuous>  metoprolol 50milliGRAM(s) Oral two times a day  baclofen 20milliGRAM(s) Oral two times a day  senna 2Tablet(s) Oral at bedtime  docusate sodium 100milliGRAM(s) Oral three times a day  polyethylene glycol 3350 17Gram(s) Oral daily  polyethylene glycol/electrolyte Solution. 2000milliLiter(s) Oral once    MEDICATIONS  (PRN):      Allergies    No Known Allergies    Intolerances        REVIEW OF SYSTEMS:  CONSTITUTIONAL: No fever, weight loss, or fatigue  EYES: No eye pain, visual disturbances, or discharge  ENMT:  No difficulty hearing, tinnitus, vertigo; No sinus or throat pain  NECK: No pain or stiffness  BREASTS: No pain, masses, or nipple discharge  RESPIRATORY: No cough, wheezing, chills or hemoptysis; No shortness of breath  CARDIOVASCULAR: No chest pain, palpitations, dizziness, or leg swelling  GASTROINTESTINAL: No abdominal or epigastric pain. No nausea, vomiting, or hematemesis; No diarrhea or constipation. No melena or hematochezia.  GENITOURINARY: No dysuria, frequency, hematuria, or incontinence  NEUROLOGICAL: No headaches, memory loss, loss of strength, numbness, or tremors  SKIN: No itching, burning, rashes, or lesions   LYMPH NODES: No enlarged glands  ENDOCRINE: No heat or cold intolerance; No hair loss  MUSCULOSKELETAL: No joint pain or swelling; No muscle, back, or extremity pain  PSYCHIATRIC: No depression, anxiety, mood swings, or difficulty sleeping  HEME/LYMPH: No easy bruising, or bleeding gums  ALLERGY AND IMMUNOLOGIC: No hives or eczema    Vital Signs Last 24 Hrs  T(C): 36.6, Max: 37.1 (01-09 @ 16:31)  T(F): 97.9, Max: 98.8 (01-09 @ 16:31)  HR: 92 (92 - 116)  BP: 122/70 (122/70 - 148/87)  BP(mean): --  RR: 18 (16 - 18)  SpO2: 100% (99% - 100%)    PHYSICAL EXAM:  GENERAL: NAD, well-groomed, well-developed  HEAD:  Atraumatic, Normocephalic  EYES: EOMI, PERRLA, conjunctiva and sclera clear  ENMT: No tonsillar erythema, exudates, or enlargement; Moist mucous membranes, Good dentition, No lesions  NECK: Supple, No JVD, Normal thyroid  NERVOUS SYSTEM:  Alert & Oriented X3, Good concentration; Motor Strength 5/5 B/L upper and lower extremities; DTRs 2+ intact and symmetric  CHEST/LUNG: Clear to percussion bilaterally; No rales, rhonchi, wheezing, or rubs  HEART: Regular rate and rhythm; No murmurs, rubs, or gallops  ABDOMEN: Soft, Nontender, Nondistended; Bowel sounds present  EXTREMITIES:  2+ Peripheral Pulses, No clubbing, cyanosis, or edema  LYMPH: No lymphadenopathy noted  SKIN: No rashes or lesions    LABS:                        9.9    9.6   )-----------( 307      ( 09 Jan 2017 06:50 )             28.6     10 Nawaf 2017 07:59    146    |  106    |  5      ----------------------------<  107    3.4     |  32     |  0.40     Ca    8.8        10 Nawaf 2017 07:59  Mg     1.8       09 Jan 2017 06:50          CAPILLARY BLOOD GLUCOSE      RADIOLOGY & ADDITIONAL TESTS:    Imaging Personally Reviewed:  [ ] YES  [ ] NO    Consultant(s) Notes Reviewed:  [ ] YES  [ ] NO    Care Discussed with Consultants/Other Providers [ ] YES  [ ] NO Patient is a 71y old  Female who presents with a chief complaint of Increasing abdominal distention with decreased bowel movements for several days. (04 Jan 2017 23:51)      INTERVAL HPI/OVERNIGHT EVENTS:  patient refused enema yesterday.  Does not want surgery or any more enemas.  No other complaints.  Counselled patient will have go lytely to encourage bm.     MEDICATIONS  (STANDING):  heparin  Injectable 5000Unit(s) SubCutaneous every 12 hours  pantoprazole  Injectable 40milliGRAM(s) IV Push daily  sodium biphosphate Rectal Enema 1Enema Rectal four times a day before meals  dextrose 5% + sodium chloride 0.45%. 1000milliLiter(s) IV Continuous <Continuous>  metoprolol 50milliGRAM(s) Oral two times a day  baclofen 20milliGRAM(s) Oral two times a day  senna 2Tablet(s) Oral at bedtime  docusate sodium 100milliGRAM(s) Oral three times a day  polyethylene glycol 3350 17Gram(s) Oral daily  polyethylene glycol/electrolyte Solution. 2000milliLiter(s) Oral once    MEDICATIONS  (PRN):      Allergies    No Known Allergies    Intolerances        REVIEW OF SYSTEMS:  CONSTITUTIONAL: No fever, weight loss, or fatigue  EYES: No eye pain, visual disturbances, or discharge  ENMT:  No difficulty hearing, tinnitus, vertigo; No sinus or throat pain  NECK: No pain or stiffness  BREASTS: No pain, masses, or nipple discharge  RESPIRATORY: No cough, wheezing, chills or hemoptysis; No shortness of breath  CARDIOVASCULAR: No chest pain, palpitations, dizziness, or leg swelling  GASTROINTESTINAL: No abdominal or epigastric pain. No nausea, vomiting, or hematemesis; No diarrhea or constipation. No melena or hematochezia.  GENITOURINARY: No dysuria, frequency, hematuria, or incontinence  NEUROLOGICAL: No headaches, memory loss, loss of strength, numbness, or tremors  SKIN: No itching, burning, rashes, or lesions   LYMPH NODES: No enlarged glands  ENDOCRINE: No heat or cold intolerance; No hair loss  MUSCULOSKELETAL: No joint pain or swelling; No muscle, back, or extremity pain  PSYCHIATRIC: No depression, anxiety, mood swings, or difficulty sleeping  HEME/LYMPH: No easy bruising, or bleeding gums  ALLERGY AND IMMUNOLOGIC: No hives or eczema    Vital Signs Last 24 Hrs  T(C): 36.6, Max: 37.1 (01-09 @ 16:31)  T(F): 97.9, Max: 98.8 (01-09 @ 16:31)  HR: 92 (92 - 116)  BP: 122/70 (122/70 - 148/87)  BP(mean): --  RR: 18 (16 - 18)  SpO2: 100% (99% - 100%)    PHYSICAL EXAM:  GENERAL: NAD, well-groomed, well-developed  HEAD:  Atraumatic, Normocephalic  EYES: EOMI, PERRLA, conjunctiva and sclera clear  ENMT: No tonsillar erythema, exudates, or enlargement; Moist mucous membranes, Good dentition, No lesions  NECK: Supple, No JVD, Normal thyroid  NERVOUS SYSTEM:  Alert & Oriented X3, Good concentration; Motor Strength 5/5 B/L upper and lower extremities; DTRs 2+ intact and symmetric  CHEST/LUNG: Clear to percussion bilaterally; No rales, rhonchi, wheezing, or rubs  HEART: Regular rate and rhythm; No murmurs, rubs, or gallops  ABDOMEN: Soft, Nontender, Nondistended; Bowel sounds present  EXTREMITIES:  2+ Peripheral Pulses, No clubbing, cyanosis, or edema  LYMPH: No lymphadenopathy noted  SKIN: No rashes or lesions    LABS:                        9.9    9.6   )-----------( 307      ( 09 Jan 2017 06:50 )             28.6     10 Nawaf 2017 07:59    146    |  106    |  5      ----------------------------<  107    3.4     |  32     |  0.40     Ca    8.8        10 Nawaf 2017 07:59  Mg     1.8       09 Jan 2017 06:50          CAPILLARY BLOOD GLUCOSE      RADIOLOGY & ADDITIONAL TESTS:    Imaging Personally Reviewed:  [ ] YES  [ ] NO    Consultant(s) Notes Reviewed:  [ ] YES  [ ] NO    Care Discussed with Consultants/Other Providers [ ] YES  [ ] NO Patient is a 71y old  Female who presents with a chief complaint of Increasing abdominal distention with decreased bowel movements for several days. (04 Jan 2017 23:51)      INTERVAL HPI/OVERNIGHT EVENTS:  patient refused enema yesterday.  Does not want surgery or any more enemas.  No other complaints.  Counselled patient will have go lytely to encourage bm.     MEDICATIONS  (STANDING):  heparin  Injectable 5000Unit(s) SubCutaneous every 12 hours  pantoprazole  Injectable 40milliGRAM(s) IV Push daily  sodium biphosphate Rectal Enema 1Enema Rectal four times a day before meals  dextrose 5% + sodium chloride 0.45%. 1000milliLiter(s) IV Continuous <Continuous>  metoprolol 50milliGRAM(s) Oral two times a day  baclofen 20milliGRAM(s) Oral two times a day  senna 2Tablet(s) Oral at bedtime  docusate sodium 100milliGRAM(s) Oral three times a day  polyethylene glycol 3350 17Gram(s) Oral daily  polyethylene glycol/electrolyte Solution. 2000milliLiter(s) Oral once    MEDICATIONS  (PRN):      Allergies    No Known Allergies    Intolerances        REVIEW OF SYSTEMS: no chest pain/SOB, diaphoresis, no F/C, cough, dizziness, headache, blurry vision, nausea, vomiting, abdominal pain. Rest unremarkable    Vital Signs Last 24 Hrs  T(C): 36.6, Max: 37.1 (01-09 @ 16:31)  T(F): 97.9, Max: 98.8 (01-09 @ 16:31)  HR: 92 (92 - 116)  BP: 122/70 (122/70 - 148/87)  BP(mean): --  RR: 18 (16 - 18)  SpO2: 100% (99% - 100%)    PHYSICAL EXAM:  GENERAL:  thin, chronically ill appearing, alert  HEAD:   Normocephalic, bilateral temporal wasting  EYES: EOMI, PERRLA, conjunctiva and sclera clear  ENMT: Tracheostomy  NECK: Supple, No JVD  NERVOUS SYSTEM:  awake, Alert, Good concentration; functional quadriplegia, contracted hands, follows simple commands  CHEST/LUNG: good air entry bilaterally coarse BS; No rales, rhonchi, wheezing, or rubs BL  HEART: Regular rate and rhythm; No murmurs, rubs, or gallops  ABDOMEN: Soft, Nontender, Nondistended; Bowel sounds present, Pardo catheter indwelling draining cloudy urine  EXTREMITIES:  2+ Peripheral Pulses, contracted BL LE  LYMPH: No cervical lymphadenopathy noted  SKIN: multiple decubiti  LABS:                        9.9    9.6   )-----------( 307      ( 09 Jan 2017 06:50 )             28.6     10 Nawaf 2017 07:59    146    |  106    |  5      ----------------------------<  107    3.4     |  32     |  0.40     Ca    8.8        10 Nawaf 2017 07:59  Mg     1.8       09 Jan 2017 06:50          CAPILLARY BLOOD GLUCOSE      RADIOLOGY & ADDITIONAL TESTS:    Imaging Personally Reviewed:  [x ] YES  [ ] NO    Consultant(s) Notes Reviewed:  [x ] YES  [ ] NO    Care Discussed with Consultants/Other Providers [x ] YES  [ ] NO

## 2017-01-10 NOTE — PROGRESS NOTE ADULT - SUBJECTIVE AND OBJECTIVE BOX
Dr. Alvino Ma contact information [phone]      Subjective: having soft BM's      Vital Signs Last 24 Hrs  T(C): 36.6, Max: 37.1 (01-09 @ 16:31)  T(F): 97.9, Max: 98.8 (01-09 @ 16:31)  HR: 92 (92 - 116)  BP: 122/70 (122/70 - 148/87)  BP(mean): --  RR: 18 (16 - 18)  SpO2: 100% (99% - 100%)                          9.9    9.6   )-----------( 307      ( 09 Jan 2017 06:50 )             28.6       09 Jan 2017 16:25    144    |  104    |  5      ----------------------------<  142    3.9     |  31     |  0.59     Ca    8.7        09 Jan 2017 16:25  Phos  1.9       08 Jan 2017 10:46  Mg     1.8       09 Jan 2017 06:50      Physical Exam:    Abdomen:  softly distended - still has stool in the sigmoid based on the last KUB

## 2017-01-11 NOTE — SWALLOW BEDSIDE ASSESSMENT ADULT - SLP PERTINENT HISTORY OF CURRENT PROBLEM
70 y/o female with HTN, CVA with trach and contractures, bed bound, HIV, fibroids, presents with abdominal distension for 4 days. Pt with BM yesterday but was loose. No fever. Pt also has had right leg swelling for 2 months, the xray report was negative. No fever/chills. No photophobia/eye pain/changes in vision, No ear pain/sore throat/dysphagia, No chest pain/palpitations. No SOB/cough/stridor. She has abdominal pain (described as discomfort) not localized, mild, no N/V/D, no black/bloody stools, but loose in consistency. No dysuria/frequency/discharge, No headache or dizziness. No numbness/tingling/weakness and no rash.
72yo female with pmh CVA with trach, bed bound, psh: fibroids, presents with abd distension x 4 days. Pt with BM yesterday. No fever. Pt also with rt leg swelling x 2 months, had xray report was neg.

## 2017-01-11 NOTE — SWALLOW BEDSIDE ASSESSMENT ADULT - SWALLOW EVAL: RECOMMENDED FEEDING/EATING TECHNIQUES
small sips/bites/allow for swallow between intakes/check mouth frequently for oral residue/pocketing/alternate food with liquid/crush medication (when feasible)/position upright (90 degrees)/maintain upright posture during/after eating for 30 mins
small sips/bites/with head at midline/crush medication (when feasible)/position upright (90 degrees)/allow for swallow between intakes

## 2017-01-11 NOTE — SWALLOW BEDSIDE ASSESSMENT ADULT - ORAL PHASE
Delayed oral transit time/Decreased anterior-posterior movement of the bolus/Stasis in lateral sulci/Lingual stasis Delayed oral transit time

## 2017-01-11 NOTE — PROGRESS NOTE ADULT - SUBJECTIVE AND OBJECTIVE BOX
Patient is a 71y old  Female who presents with a chief complaint of Increasing abdominal distention with decreased bowel movements for several days. (04 Jan 2017 23:51)      INTERVAL HPI/OVERNIGHT EVENTS:    MEDICATIONS  (STANDING):  heparin  Injectable 5000Unit(s) SubCutaneous every 12 hours  pantoprazole  Injectable 40milliGRAM(s) IV Push daily  sodium biphosphate Rectal Enema 1Enema Rectal four times a day before meals  dextrose 5% + sodium chloride 0.45%. 1000milliLiter(s) IV Continuous <Continuous>  metoprolol 50milliGRAM(s) Oral two times a day  baclofen 20milliGRAM(s) Oral two times a day  senna 2Tablet(s) Oral at bedtime  polyethylene glycol 3350 17Gram(s) Oral daily    MEDICATIONS  (PRN):      Allergies    No Known Allergies    Intolerances        REVIEW OF SYSTEMS:  CONSTITUTIONAL: No fever, weight loss, or fatigue  EYES: No eye pain, visual disturbances, or discharge  ENMT:  No difficulty hearing, tinnitus, vertigo; No sinus or throat pain  NECK: No pain or stiffness  BREASTS: No pain, masses, or nipple discharge  RESPIRATORY: No cough, wheezing, chills or hemoptysis; No shortness of breath  CARDIOVASCULAR: No chest pain, palpitations, dizziness, or leg swelling  GASTROINTESTINAL: No abdominal or epigastric pain. No nausea, vomiting, or hematemesis; No diarrhea or constipation. No melena or hematochezia.  GENITOURINARY: No dysuria, frequency, hematuria, or incontinence  NEUROLOGICAL: No headaches, memory loss, loss of strength, numbness, or tremors  SKIN: No itching, burning, rashes, or lesions   LYMPH NODES: No enlarged glands  ENDOCRINE: No heat or cold intolerance; No hair loss  MUSCULOSKELETAL: No joint pain or swelling; No muscle, back, or extremity pain  PSYCHIATRIC: No depression, anxiety, mood swings, or difficulty sleeping  HEME/LYMPH: No easy bruising, or bleeding gums  ALLERGY AND IMMUNOLOGIC: No hives or eczema    Vital Signs Last 24 Hrs  T(C): 37.1, Max: 37.1 (01-11 @ 05:36)  T(F): 98.8, Max: 98.8 (01-11 @ 05:36)  HR: 112 (98 - 118)  BP: 141/67 (141/67 - 151/91)  BP(mean): --  RR: 18 (16 - 18)  SpO2: 97% (97% - 100%)    PHYSICAL EXAM:  GENERAL: NAD, well-groomed, well-developed  HEAD:  Atraumatic, Normocephalic  EYES: EOMI, PERRLA, conjunctiva and sclera clear  ENMT: No tonsillar erythema, exudates, or enlargement; Moist mucous membranes, Good dentition, No lesions  NECK: Supple, No JVD, Normal thyroid  NERVOUS SYSTEM:  Alert & Oriented X3, Good concentration; Motor Strength 5/5 B/L upper and lower extremities; DTRs 2+ intact and symmetric  CHEST/LUNG: Clear to percussion bilaterally; No rales, rhonchi, wheezing, or rubs  HEART: Regular rate and rhythm; No murmurs, rubs, or gallops  ABDOMEN: Soft, Nontender, Nondistended; Bowel sounds present  EXTREMITIES:  2+ Peripheral Pulses, No clubbing, cyanosis, or edema  LYMPH: No lymphadenopathy noted  SKIN: No rashes or lesions    LABS:    10 Nawaf 2017 07:59    146    |  106    |  5      ----------------------------<  107    3.4     |  32     |  0.40     Ca    8.8        10 Nawaf 2017 07:59          CAPILLARY BLOOD GLUCOSE      RADIOLOGY & ADDITIONAL TESTS:    Imaging Personally Reviewed:  [ ] YES  [ ] NO    Consultant(s) Notes Reviewed:  [ ] YES  [ ] NO    Care Discussed with Consultants/Other Providers [ ] YES  [ ] NO Patient is a 71y old  Female who presents with a chief complaint of Increasing abdominal distention with decreased bowel movements for several days. (04 Jan 2017 23:51)      INTERVAL HPI/OVERNIGHT EVENTS:  patient with 3 bowel movements overnight as per rn.  Patient currently does not want enemas.  As per RN patient is also coughing after eating.  No fever or chills.  Patient denies abdominal pain.    MEDICATIONS  (STANDING):  heparin  Injectable 5000Unit(s) SubCutaneous every 12 hours  pantoprazole  Injectable 40milliGRAM(s) IV Push daily  sodium biphosphate Rectal Enema 1Enema Rectal four times a day before meals  dextrose 5% + sodium chloride 0.45%. 1000milliLiter(s) IV Continuous <Continuous>  metoprolol 50milliGRAM(s) Oral two times a day  baclofen 20milliGRAM(s) Oral two times a day  senna 2Tablet(s) Oral at bedtime  polyethylene glycol 3350 17Gram(s) Oral daily    MEDICATIONS  (PRN):      Allergies    No Known Allergies    Intolerances        REVIEW OF SYSTEMS: no chest pain/SOB, diaphoresis, no F/C, dizziness, headache, blurry vision, nausea, vomiting, abdominal pain.  Cough after eating as per RN. Rest unremarkable    Vital Signs Last 24 Hrs  T(C): 37.1, Max: 37.1 (01-11 @ 05:36)  T(F): 98.8, Max: 98.8 (01-11 @ 05:36)  HR: 112 (98 - 118)  BP: 141/67 (141/67 - 151/91)  BP(mean): --  RR: 18 (16 - 18)  SpO2: 97% (97% - 100%)    PHYSICAL EXAM:  GENERAL:  thin, chronically ill appearing, alert  HEAD:   Normocephalic, bilateral temporal wasting  EYES: EOMI, PERRLA, conjunctiva and sclera clear  ENMT: Tracheostomy  NECK: Supple, No JVD  NERVOUS SYSTEM:  awake, Alert, Good concentration; functional quadriplegia, contracted hands, follows simple commands  CHEST/LUNG: good air entry bilaterally coarse BS; No rales, rhonchi, wheezing, or rubs BL  HEART: Regular rate and rhythm; No murmurs, rubs, or gallops  ABDOMEN: Soft, Nontender, Nondistended; Bowel sounds present, Pardo catheter indwelling draining cloudy urine  EXTREMITIES:  2+ Peripheral Pulses, contracted BL LE  LYMPH: No cervical lymphadenopathy noted  SKIN: multiple decubiti    LABS:    10 Nawaf 2017 07:59    146    |  106    |  5      ----------------------------<  107    3.4     |  32     |  0.40     Ca    8.8        10 Nawaf 2017 07:59          CAPILLARY BLOOD GLUCOSE      RADIOLOGY & ADDITIONAL TESTS:    Imaging Personally Reviewed:  [ ] YES  [ ] NO    Consultant(s) Notes Reviewed:  [x ] YES  [ ] NO    Care Discussed with Consultants/Other Providers [x ] YES  [ ] NO

## 2017-01-11 NOTE — PROGRESS NOTE ADULT - SUBJECTIVE AND OBJECTIVE BOX
Pt had multiple bowel movements overnight; feels comfortable  Concerned about eating and possible aspiration      MEDICATIONS  (STANDING):  heparin  Injectable 5000Unit(s) SubCutaneous every 12 hours  pantoprazole  Injectable 40milliGRAM(s) IV Push daily  sodium biphosphate Rectal Enema 1Enema Rectal four times a day before meals  dextrose 5% + sodium chloride 0.45%. 1000milliLiter(s) IV Continuous <Continuous>  metoprolol 50milliGRAM(s) Oral two times a day  baclofen 20milliGRAM(s) Oral two times a day  senna 2Tablet(s) Oral at bedtime  polyethylene glycol 3350 17Gram(s) Oral daily  polyethylene glycol/electrolyte Solution. 2000milliLiter(s) Oral once    MEDICATIONS  (PRN):      Allergies    No Known Allergies    Intolerances        Vital Signs Last 24 Hrs  T(C): 37.1, Max: 37.1 (01-11 @ 05:36)  T(F): 98.8, Max: 98.8 (01-11 @ 05:36)  HR: 112 (98 - 118)  BP: 141/67 (141/67 - 151/91)  BP(mean): --  RR: 18 (16 - 18)  SpO2: 97% (97% - 100%)    PHYSICAL EXAM:  General: NAD.  CVS: S1, S2  Chest: air entry bilaterally present  Abd: BS present, soft, non-tender      LABS:    10 Nawaf 2017 07:59    146    |  106    |  5      ----------------------------<  107    3.4     |  32     |  0.40     Ca    8.8        10 Nawaf 2017 07:59          Pt to have swallowing evaluation  start miralax daily

## 2017-01-11 NOTE — PROGRESS NOTE ADULT - PROBLEM SELECTOR PLAN 1
- manual disimpaction and enemas PRN--patient currently refusing  to give 2 liters of go lytely again, s/p 3 bms yesterday.  - no evidence for and colonic perforation or stereocoral ulcer per surgery  - Cont on IVF, monitor for any pain

## 2017-01-11 NOTE — SWALLOW BEDSIDE ASSESSMENT ADULT - PHARYNGEAL PHASE
Delayed pharyngeal swallow/Decreased laryngeal elevation/Multiple swallows Multiple swallows/Decreased laryngeal elevation/Delayed pharyngeal swallow

## 2017-01-11 NOTE — SWALLOW BEDSIDE ASSESSMENT ADULT - SPECIFY REASON(S)
as per RN and CNA pt coughing this AM as per RN and CNA pt coughing this AM. pt trached, 6 shiley, cuffless and capped.

## 2017-01-11 NOTE — SWALLOW BEDSIDE ASSESSMENT ADULT - ORAL PREPARATORY PHASE
Reduced oral grading pt requested straw and noted at times pt had difficulty drawing liquid into the straw

## 2017-01-11 NOTE — PROGRESS NOTE ADULT - SUBJECTIVE AND OBJECTIVE BOX
Patient is a 71y old  Female who presents with a chief complaint of Increasing abdominal distention with decreased bowel movements for several days. (2017 23:51)      INTERVAL HPI / OVERNIGHT EVENTS: No new events, pt had a BM .    MEDICATIONS  (STANDING):  heparin  Injectable 5000Unit(s) SubCutaneous every 12 hours  pantoprazole  Injectable 40milliGRAM(s) IV Push daily    sodium biphosphate Rectal Enema 1Enema Rectal four times a day before meals  sodium chloride 0.9%. 1000milliLiter(s) IV Continuous <Continuous>    MEDICATIONS  (PRN):      Vital Signs Last 24 Hrs  Tmax afebrile    PHYSICAL EXAM:    Respiratory: CTAB/L, s/p trach  Cardiovascular: S1 and S2, RRR, no M/G/R  Gastrointestinal: BS+, soft, NT/ND  Extremities: No peripheral edema  Vascular: 2+ peripheral pulses  Skin: No rashes  Pardo catheter present    LABS:  Cr 0.40        Urinalysis Basic - ( 2017 06:27 )    Color: Yellow / Appearance: Clear / S.020 / pH: x  Gluc: x / Ketone: Large  / Bili: Negative / Urobili: 4 mg/dL   Blood: x / Protein: 100 mg/dL / Nitrite: Positive   Leuk Esterase: Moderate / RBC: x / WBC 6-10   Sq Epi: x / Non Sq Epi: Occasional / Bacteria: Moderate          MICROBIOLOGY:    Culture Results -   No growth  Gram Stain - --  Organism - --  Organism Identification - --  Specimen Source - .Urine Clean Catch (Midstream)      CD4 999  Vl undetectable  HIV serology -undetectable              AFB:        RADIOLOGY & ADDITIONAL STUDIES:

## 2017-01-11 NOTE — SWALLOW BEDSIDE ASSESSMENT ADULT - SLP GENERAL OBSERVATIONS
Pt alert and oriented, able to relay pertinent information regarding previous diet. Finger occlusion required for voicing. Pt with increased c/o decreased preference for PO.
pt seen bedside during lunch, alert and oriented x2-3. pt responded to questions, and initiated wants/needs. noted moderate dysarthria. head in slight hyperextension, pt needed min prompts to bring head to midline

## 2017-01-11 NOTE — PROGRESS NOTE ADULT - SUBJECTIVE AND OBJECTIVE BOX
Patient seen and examined bedside resting comfortably.  She is refusing enemas.    T(F): 98.8, Max: 98.8 (01-11 @ 05:36)  HR: 112 (98 - 118)  BP: 141/67 (141/67 - 151/91)  RR: 18 (16 - 18)  SpO2: 97% (97% - 100%)    PHYSICAL EXAM:  General: alert and awake  HEENT: NCAT, EOMI, conjunctiva clear. Tracheostomy  Abdomen: soft, nontender. softly distended.  Extremities: contracted x 4    LABS:    10 Nawaf 2017 07:59    146    |  106    |  5      ----------------------------<  107    3.4     |  32     |  0.40     Ca    8.8        10 Nawaf 2017 07:59      Culture Results:   No growth (01-05 @ 10:50)      Impression: 71y Female PMH HIV, HTN, CVA admitted with fecal impaction and right hydronephrosis due to large fecal mass     Plan:  -continue present medical management  -continue present PO stool softeners and bowel regimen  -enemas if patient agrees  -will discuss with surgical attending

## 2017-01-11 NOTE — SWALLOW BEDSIDE ASSESSMENT ADULT - SWALLOW EVAL: DIAGNOSIS
Pt presents with mild to moderate oropharyngeal dysphagia, characterized by oral residue, delayed A-P transport, delayed swallow. Pt presents with grossly functional swallow with presented consistencies.
pt presented with oropharyngeal dysphagia marked by reduced labial seal to spoon, slow oral transport, reduced bolus formation causing oral residue- cleared with liquid wash, suspect delay in swallow trigger with reduced laryngeal elevation. noted multiple swallows.

## 2017-01-12 NOTE — DISCHARGE NOTE ADULT - HOSPITAL COURSE
70 y/o female with HTN, CVA with trach and contractures, bed bound, fibroids, presents with abdominal distension for 4 days. Pt with BM yesterday but was loose. No fever. Pt also has had right leg swelling for 2 months, the xray report was negative. No fever/chills. No photophobia/eye pain/changes in vision, No ear pain/sore throat/dysphagia, No chest pain/palpitations. No SOB/cough/stridor. She has abdominal pain (described as discomfort) not localized, mild, no N/V/D, no black/bloody stools, but loose in consistency. No dysuria/frequency/discharge, No headache or dizziness. No numbness/tingling/weakness and no rash.    Patient was found to be dehydrated in acute renal failure and with fecal impaction.  patient was seen by surgery Dr. Ma and GI Dr. Bland.  Patient was manually disimpacted x3  and received multiple enemas and go lytely.  She had bm.  She was treated with ceftriaxone for uti, and symptomatically improved.  She was hypernatremic and hypokalemic, her electrolytes were supplemented and she was deemed stable for discharge to home

## 2017-01-12 NOTE — DISCHARGE NOTE ADULT - CARE PLAN
Principal Discharge DX:	UTI (urinary tract infection) with pyuria  Goal:	s/p antibiotics in hospital  Instructions for follow-up, activity and diet:	continue diet, aspiration precautions  Secondary Diagnosis:	Cerebrovascular accident (CVA), unspecified mechanism  Secondary Diagnosis:	Acute hypernatremia  Secondary Diagnosis:	Essential hypertension  Secondary Diagnosis:	Hydronephrosis of right kidney  Secondary Diagnosis:	Hypokalemia  Secondary Diagnosis:	Pressure ulcer, stage 3

## 2017-01-12 NOTE — DISCHARGE NOTE ADULT - CARE PROVIDER_API CALL
YOUR PMD,   Phone: (   )    -  Fax: (   )    - YOUR PMD,   Phone: (   )    -  Fax: (   )    -    Dr. Aquilino Acevedo MD  Phone: (   )    -  Fax: (   )    -

## 2017-01-12 NOTE — PROGRESS NOTE ADULT - PROBLEM SELECTOR PLAN 1
- s/p manual disimpaction and enemas PRN--patient currently refusing  continue with go lytely s/p 3 bms yesterday.  - no evidence for and colonic perforation or stereocoral ulcer per surgery  - change ivf to ivl

## 2017-01-12 NOTE — DISCHARGE NOTE ADULT - PROVIDER TOKENS
FREE:[LAST:[YOUR PMD],PHONE:[(   )    -],FAX:[(   )    -]] FREE:[LAST:[YOUR PMD],PHONE:[(   )    -],FAX:[(   )    -]],FREE:[LAST:[[place]],FIRST:[],PHONE:[(   )    -],FAX:[(   )    -],ADDRESS:[[address]]]

## 2017-01-12 NOTE — DISCHARGE NOTE ADULT - PATIENT PORTAL LINK FT
“You can access the [employer] Patient Portal, offered by [facility], by registering with the following website: [email or web address]”

## 2017-01-12 NOTE — PROGRESS NOTE ADULT - SUBJECTIVE AND OBJECTIVE BOX
Patient is a 71y old  Female who presents with a chief complaint of Increasing abdominal distention with decreased bowel movements for several days. (04 Jan 2017 23:51)      INTERVAL HPI/OVERNIGHT EVENTS:    MEDICATIONS  (STANDING):  heparin  Injectable 5000Unit(s) SubCutaneous every 12 hours  pantoprazole  Injectable 40milliGRAM(s) IV Push daily  dextrose 5% + sodium chloride 0.45%. 1000milliLiter(s) IV Continuous <Continuous>  metoprolol 50milliGRAM(s) Oral two times a day  baclofen 20milliGRAM(s) Oral two times a day  senna 2Tablet(s) Oral at bedtime  polyethylene glycol 3350 17Gram(s) Oral daily    MEDICATIONS  (PRN):      Allergies    No Known Allergies    Intolerances        REVIEW OF SYSTEMS:  CONSTITUTIONAL: No fever, weight loss, or fatigue  EYES: No eye pain, visual disturbances, or discharge  ENMT:  No difficulty hearing, tinnitus, vertigo; No sinus or throat pain  NECK: No pain or stiffness  BREASTS: No pain, masses, or nipple discharge  RESPIRATORY: No cough, wheezing, chills or hemoptysis; No shortness of breath  CARDIOVASCULAR: No chest pain, palpitations, dizziness, or leg swelling  GASTROINTESTINAL: No abdominal or epigastric pain. No nausea, vomiting, or hematemesis; No diarrhea or constipation. No melena or hematochezia.  GENITOURINARY: No dysuria, frequency, hematuria, or incontinence  NEUROLOGICAL: No headaches, memory loss, loss of strength, numbness, or tremors  SKIN: No itching, burning, rashes, or lesions   LYMPH NODES: No enlarged glands  ENDOCRINE: No heat or cold intolerance; No hair loss  MUSCULOSKELETAL: No joint pain or swelling; No muscle, back, or extremity pain  PSYCHIATRIC: No depression, anxiety, mood swings, or difficulty sleeping  HEME/LYMPH: No easy bruising, or bleeding gums  ALLERGY AND IMMUNOLOGIC: No hives or eczema    Vital Signs Last 24 Hrs  T(C): 37.7, Max: 37.7 (01-12 @ 05:01)  T(F): 99.9, Max: 99.9 (01-12 @ 05:01)  HR: 106 (96 - 106)  BP: 145/77 (122/71 - 145/77)  BP(mean): --  RR: 18 (17 - 18)  SpO2: 99% (98% - 100%)    PHYSICAL EXAM:  GENERAL: NAD, well-groomed, well-developed  HEAD:  Atraumatic, Normocephalic  EYES: EOMI, PERRLA, conjunctiva and sclera clear  ENMT: No tonsillar erythema, exudates, or enlargement; Moist mucous membranes, Good dentition, No lesions  NECK: Supple, No JVD, Normal thyroid  NERVOUS SYSTEM:  Alert & Oriented X3, Good concentration; Motor Strength 5/5 B/L upper and lower extremities; DTRs 2+ intact and symmetric  CHEST/LUNG: Clear to percussion bilaterally; No rales, rhonchi, wheezing, or rubs  HEART: Regular rate and rhythm; No murmurs, rubs, or gallops  ABDOMEN: Soft, Nontender, Nondistended; Bowel sounds present  EXTREMITIES:  2+ Peripheral Pulses, No clubbing, cyanosis, or edema  LYMPH: No lymphadenopathy noted  SKIN: No rashes or lesions    LABS:              CAPILLARY BLOOD GLUCOSE      RADIOLOGY & ADDITIONAL TESTS:    Imaging Personally Reviewed:  [ ] YES  [ ] NO    Consultant(s) Notes Reviewed:  [ ] YES  [ ] NO    Care Discussed with Consultants/Other Providers [ ] YES  [ ] NO Patient is a 71y old  Female who presents with a chief complaint of Increasing abdominal distention with decreased bowel movements for several days. (04 Jan 2017 23:51)      INTERVAL HPI/OVERNIGHT EVENTS:  patient with 3 bowel movements overnight as per rn.  Patient took mineral oil enema yesterday; has refused subsequent enemas.  Eating well no other complaints    MEDICATIONS  (STANDING):  heparin  Injectable 5000Unit(s) SubCutaneous every 12 hours  pantoprazole  Injectable 40milliGRAM(s) IV Push daily  dextrose 5% + sodium chloride 0.45%. 1000milliLiter(s) IV Continuous <Continuous>  metoprolol 50milliGRAM(s) Oral two times a day  baclofen 20milliGRAM(s) Oral two times a day  senna 2Tablet(s) Oral at bedtime  polyethylene glycol 3350 17Gram(s) Oral daily    MEDICATIONS  (PRN):      Allergies    No Known Allergies    Intolerances          REVIEW OF SYSTEMS:   no chest pain/SOB, diaphoresis, no F/C, dizziness, headache, blurry vision, nausea, vomiting, abdominal pain.  no further cough as per RN. Rest unremarkable    Vital Signs Last 24 Hrs  T(C): 37.7, Max: 37.7 (01-12 @ 05:01)  T(F): 99.9, Max: 99.9 (01-12 @ 05:01)  HR: 106 (96 - 106)  BP: 145/77 (122/71 - 145/77)  BP(mean): --  RR: 18 (17 - 18)  SpO2: 99% (98% - 100%)    PHYSICAL EXAM:  GENERAL:  thin, chronically ill appearing, alert  HEAD:   Normocephalic, bilateral temporal wasting  EYES: EOMI, PERRLA, conjunctiva and sclera clear  ENMT: Tracheostomy  NECK: Supple, No JVD  NERVOUS SYSTEM:  awake, Alert, Good concentration; functional quadriplegia, contracted hands, follows simple commands  CHEST/LUNG: good air entry bilaterally coarse BS; No rales, rhonchi, wheezing, or rubs BL  HEART: Regular rate and rhythm; No murmurs, rubs, or gallops  ABDOMEN: Soft, Nontender, Nondistended; Bowel sounds present, Pardo catheter indwelling draining cloudy urine  EXTREMITIES:  2+ Peripheral Pulses, contracted BL LE  LYMPH: No cervical lymphadenopathy noted  SKIN: multiple decubiti    LABS:              CAPILLARY BLOOD GLUCOSE      RADIOLOGY & ADDITIONAL TESTS:    Imaging Personally Reviewed:  [x ] YES  [ ] NO    Consultant(s) Notes Reviewed:  [ ] YES  [ ] NO    Care Discussed with Consultants/Other Providers [ ] YES  [ ] NO

## 2017-01-12 NOTE — PROGRESS NOTE ADULT - SUBJECTIVE AND OBJECTIVE BOX
Surgical Attending Lincoln Flower MD  [phone]    Hospital Day #: 8    Procedure:  bowel regimen and disimpaction for rectal impaction  Received Golytely yesterday and has had multiple BMs for at least two nights in a row.  Still refusing enemas.    Vital Signs Last 24 Hrs  T(C): 37.7, Max: 37.7 (01-12 @ 05:01)  T(F): 99.9, Max: 99.9 (01-12 @ 05:01)  HR: 106 (96 - 106)  BP: 145/77 (122/71 - 145/77)  BP(mean): --  RR: 18 (17 - 18)  SpO2: 99%    Physical Exam:    Abdomen:  soft but still somewhat distended. Non-tender

## 2017-01-12 NOTE — DISCHARGE NOTE ADULT - MEDICATION SUMMARY - MEDICATIONS TO TAKE
I will START or STAY ON the medications listed below when I get home from the hospital:    metoprolol tartrate 50 mg oral tablet  -- 1 tab(s) by mouth 2 times a day  -- Indication: For Hypertension    polyethylene glycol 3350 oral powder for reconstitution  -- 17 gram(s) by mouth once a day  -- Indication: For Chronic constipation    senna oral tablet  -- 2 tab(s) by mouth once a day (at bedtime)  -- Indication: For Chronic constipation    Metamucil 3.4 g/3.7 g oral powder for reconstitution  -- 1 dose(s) by mouth once a day in 8 ounces water  -- Indication: For Chronic constipation    baclofen 20 mg oral tablet  -- 20 milligram(s) by mouth 2 times a day  -- Indication: For muscle spasm    multivitamin  --     -- Liquid  multivitamin daily  -- Indication: For micronutrient supplementation

## 2017-01-12 NOTE — PROGRESS NOTE ADULT - SUBJECTIVE AND OBJECTIVE BOX
Swallow eval noted; Pt had 3 BM overnight      MEDICATIONS  (STANDING):  heparin  Injectable 5000Unit(s) SubCutaneous every 12 hours  pantoprazole  Injectable 40milliGRAM(s) IV Push daily  sodium biphosphate Rectal Enema 1Enema Rectal four times a day before meals  dextrose 5% + sodium chloride 0.45%. 1000milliLiter(s) IV Continuous <Continuous>  metoprolol 50milliGRAM(s) Oral two times a day  baclofen 20milliGRAM(s) Oral two times a day  senna 2Tablet(s) Oral at bedtime  polyethylene glycol 3350 17Gram(s) Oral daily    MEDICATIONS  (PRN):      Allergies    No Known Allergies    Intolerances        Vital Signs Last 24 Hrs  T(C): 37.7, Max: 37.7 (01-12 @ 05:01)  T(F): 99.9, Max: 99.9 (01-12 @ 05:01)  HR: 106 (96 - 106)  BP: 145/77 (122/71 - 145/77)  BP(mean): --  RR: 18 (17 - 18)  SpO2: 99% (98% - 100%)    PHYSICAL EXAM:  General: NAD.  CVS: S1, S2  Chest: air entry bilaterally present  Abd: BS present, soft, non-tender          repeat labs  d/c enemas   cont miralax

## 2017-01-12 NOTE — DISCHARGE NOTE ADULT - SECONDARY DIAGNOSIS.
Cerebrovascular accident (CVA), unspecified mechanism Acute hypernatremia Essential hypertension Hydronephrosis of right kidney Hypokalemia Pressure ulcer, stage 3

## 2017-01-13 NOTE — PROGRESS NOTE ADULT - PROBLEM SELECTOR PLAN 2
- Likely due to extrinsic compression. treat underlying etiology. Stent not indicated.  - urology follow up
- Likely due to extrinsic compression. treat underlying etiology. Stent not indicated.  - urology follow up
- Likely due to extrinsic compression. treat underlying etiology. Stent not indicated.  - urology follow up  kidney function appears to be at baseline
- Likely due to extrinsic compression. treat underlying etiology. Stent not indicated.  - urology follow up  kidney function appears to be at baseline
kidney function appears to be at baseline
kidney function appears to be at baseline
should resolve after fecal impaction relieved, urinalysis pending  urology consult
- Likely due to extrinsic compression. treat underlying etiology. Stent not indicated.  - urology follow up  kidney function appears to be at baseline
should resolve after fecal impaction relieved, urinalysis pending  urology consult appreciated

## 2017-01-13 NOTE — PROGRESS NOTE ADULT - PROBLEM SELECTOR PLAN 1
- s/p manual disimpaction and enemas PRN--patient currently refusing  patient with loose bm as per rn.  will discharge on aggressive bowel regimen

## 2017-01-13 NOTE — PROGRESS NOTE ADULT - PROBLEM SELECTOR PROBLEM 7
HIV (human immunodeficiency virus infection)
Acute hypernatremia

## 2017-01-13 NOTE — PROGRESS NOTE ADULT - PROBLEM SELECTOR PROBLEM 5
S/P tracheoplasty
Acute hypernatremia
Underweight due to inadequate caloric intake

## 2017-01-13 NOTE — PROGRESS NOTE ADULT - PROBLEM SELECTOR PLAN 9
stage 3 right lateral buttock decubitus ulcer present on admission  also stage 1 right and left buttock decubiti POA  continue present wound care, frequent re positioning

## 2017-01-13 NOTE — PROGRESS NOTE ADULT - PROBLEM SELECTOR PROBLEM 8
Cerebrovascular accident (CVA), unspecified mechanism

## 2017-01-13 NOTE — PROGRESS NOTE ADULT - PROBLEM SELECTOR PROBLEM 4
HIV (human immunodeficiency virus infection)
UTI (urinary tract infection) with pyuria
HIV (human immunodeficiency virus infection)
UTI (urinary tract infection) with pyuria

## 2017-01-13 NOTE — PROGRESS NOTE ADULT - PROBLEM SELECTOR PROBLEM 3
UTI (urinary tract infection) with pyuria
Cerebrovascular accident (CVA), unspecified mechanism
Cerebrovascular accident (CVA), unspecified mechanism
Underweight due to inadequate caloric intake
Cerebrovascular accident (CVA), unspecified mechanism
Underweight due to inadequate caloric intake

## 2017-01-13 NOTE — PROGRESS NOTE ADULT - PROBLEM SELECTOR PROBLEM 2
Hydronephrosis of right kidney

## 2017-01-13 NOTE — PROGRESS NOTE ADULT - SUBJECTIVE AND OBJECTIVE BOX
Patient is a 71y old  Female who presents with a chief complaint of Increasing abdominal distention with decreased bowel movements for several days. (12 Jan 2017 15:41)      INTERVAL HPI/OVERNIGHT EVENTS:    MEDICATIONS  (STANDING):  heparin  Injectable 5000Unit(s) SubCutaneous every 12 hours  pantoprazole  Injectable 40milliGRAM(s) IV Push daily  metoprolol 50milliGRAM(s) Oral two times a day  baclofen 20milliGRAM(s) Oral two times a day  senna 2Tablet(s) Oral at bedtime  polyethylene glycol 3350 17Gram(s) Oral daily    MEDICATIONS  (PRN):      Allergies    No Known Allergies    Intolerances        REVIEW OF SYSTEMS:  CONSTITUTIONAL: No fever, weight loss, or fatigue  EYES: No eye pain, visual disturbances, or discharge  ENMT:  No difficulty hearing, tinnitus, vertigo; No sinus or throat pain  NECK: No pain or stiffness  BREASTS: No pain, masses, or nipple discharge  RESPIRATORY: No cough, wheezing, chills or hemoptysis; No shortness of breath  CARDIOVASCULAR: No chest pain, palpitations, dizziness, or leg swelling  GASTROINTESTINAL: No abdominal or epigastric pain. No nausea, vomiting, or hematemesis; No diarrhea or constipation. No melena or hematochezia.  GENITOURINARY: No dysuria, frequency, hematuria, or incontinence  NEUROLOGICAL: No headaches, memory loss, loss of strength, numbness, or tremors  SKIN: No itching, burning, rashes, or lesions   LYMPH NODES: No enlarged glands  ENDOCRINE: No heat or cold intolerance; No hair loss  MUSCULOSKELETAL: No joint pain or swelling; No muscle, back, or extremity pain  PSYCHIATRIC: No depression, anxiety, mood swings, or difficulty sleeping  HEME/LYMPH: No easy bruising, or bleeding gums  ALLERGY AND IMMUNOLOGIC: No hives or eczema    Vital Signs Last 24 Hrs  T(C): 37.1, Max: 37.2 (01-12 @ 11:11)  T(F): 98.8, Max: 99 (01-12 @ 11:11)  HR: 116 (98 - 122)  BP: 129/65 (111/57 - 129/65)  BP(mean): --  RR: 16 (16 - 18)  SpO2: 99% (97% - 100%)    PHYSICAL EXAM:  GENERAL: NAD, well-groomed, well-developed  HEAD:  Atraumatic, Normocephalic  EYES: EOMI, PERRLA, conjunctiva and sclera clear  ENMT: No tonsillar erythema, exudates, or enlargement; Moist mucous membranes, Good dentition, No lesions  NECK: Supple, No JVD, Normal thyroid  NERVOUS SYSTEM:  Alert & Oriented X3, Good concentration; Motor Strength 5/5 B/L upper and lower extremities; DTRs 2+ intact and symmetric  CHEST/LUNG: Clear to percussion bilaterally; No rales, rhonchi, wheezing, or rubs  HEART: Regular rate and rhythm; No murmurs, rubs, or gallops  ABDOMEN: Soft, Nontender, Nondistended; Bowel sounds present  EXTREMITIES:  2+ Peripheral Pulses, No clubbing, cyanosis, or edema  LYMPH: No lymphadenopathy noted  SKIN: No rashes or lesions    LABS:                        10.0   10.4  )-----------( 343      ( 13 Jan 2017 08:17 )             29.2     12 Jan 2017 12:19    144    |  104    |  3      ----------------------------<  120    2.9     |  34     |  0.46     Ca    8.4        12 Jan 2017 12:19          CAPILLARY BLOOD GLUCOSE      RADIOLOGY & ADDITIONAL TESTS:    Imaging Personally Reviewed:  [ ] YES  [ ] NO    Consultant(s) Notes Reviewed:  [ ] YES  [ ] NO    Care Discussed with Consultants/Other Providers [ ] YES  [ ] NO Patient is a 71y old  Female who presents with a chief complaint of Increasing abdominal distention with decreased bowel movements for several days. (12 Jan 2017 15:41)      INTERVAL HPI/OVERNIGHT EVENTS:    MEDICATIONS  (STANDING):  heparin  Injectable 5000Unit(s) SubCutaneous every 12 hours  pantoprazole  Injectable 40milliGRAM(s) IV Push daily  metoprolol 50milliGRAM(s) Oral two times a day  baclofen 20milliGRAM(s) Oral two times a day  senna 2Tablet(s) Oral at bedtime  polyethylene glycol 3350 17Gram(s) Oral daily    MEDICATIONS  (PRN):      Allergies    No Known Allergies    Intolerances        REVIEW OF SYSTEMS:   no chest pain/SOB, diaphoresis, no F/C, dizziness, headache, blurry vision, nausea, vomiting, abdominal pain.  no further cough as per RN. Rest unremarkable    Vital Signs Last 24 Hrs  T(C): 37.1, Max: 37.2 (01-12 @ 11:11)  T(F): 98.8, Max: 99 (01-12 @ 11:11)  HR: 116 (98 - 122)  BP: 129/65 (111/57 - 129/65)  BP(mean): --  RR: 16 (16 - 18)  SpO2: 99% (97% - 100%)    PHYSICAL EXAM:  GENERAL:  thin, chronically ill appearing, alert  HEAD:   Normocephalic, bilateral temporal wasting  EYES: EOMI, PERRLA, conjunctiva and sclera clear  ENMT: Tracheostomy  NECK: Supple, No JVD  NERVOUS SYSTEM:  awake, Alert, Good concentration; functional quadriplegia, contracted hands, follows simple commands  CHEST/LUNG: good air entry bilaterally coarse BS; No rales, rhonchi, wheezing, or rubs BL  HEART: Regular rate and rhythm; No murmurs, rubs, or gallops  ABDOMEN: Soft, Nontender, Nondistended; Bowel sounds present, Pardo catheter indwelling draining cloudy urine  EXTREMITIES:  2+ Peripheral Pulses, contracted BL LE  LYMPH: No cervical lymphadenopathy noted  SKIN: multiple decubiti  LABS:                        10.0   10.4  )-----------( 343      ( 13 Jan 2017 08:17 )             29.2     12 Jan 2017 12:19    144    |  104    |  3      ----------------------------<  120    2.9     |  34     |  0.46     Ca    8.4        12 Jan 2017 12:19          CAPILLARY BLOOD GLUCOSE      RADIOLOGY & ADDITIONAL TESTS:    Imaging Personally Reviewed:  [ ] YES  [ ] NO    Consultant(s) Notes Reviewed:  [ ] YES  [ ] NO    Care Discussed with Consultants/Other Providers [ ] YES  [ ] NO

## 2017-01-13 NOTE — PROGRESS NOTE ADULT - ATTENDING COMMENTS
No need to continue daily follow-up visits.  Continue bowel regimen. per GI.
CALL prn
d/c planning for tomorrow if stable, discussed with patient who agrees with plan
stable for discharge to home, to reestablish care with home visiting md.  Plan of care discussed with patient, mother and sister in person  greater than 30 minutes on discharge
will have speech and swallow follow up re: repeat speech and swallow evaluation

## 2017-01-13 NOTE — PROGRESS NOTE ADULT - PROBLEM SELECTOR PROBLEM 6
Hypokalemia
UTI (urinary tract infection) with pyuria

## 2017-01-13 NOTE — PROGRESS NOTE ADULT - PROBLEM SELECTOR PROBLEM 9
Pressure ulcer, stage 3

## 2017-01-13 NOTE — PROGRESS NOTE ADULT - PROVIDER SPECIALTY LIST ADULT
Gastroenterology
Hospitalist
Infectious Disease
Surgery

## 2017-01-13 NOTE — PROGRESS NOTE ADULT - PROBLEM SELECTOR PLAN 7
- ID following up
is not a problem, patient is HIV negative--will discontinue from problem list
is not a problem, patient is HIV negative--will discontinue from problem list
- ID following up
- change IV to D5/0.45% NS  - trend bmp

## 2017-01-13 NOTE — PROGRESS NOTE ADULT - SUBJECTIVE AND OBJECTIVE BOX
No changes - stable - had BM    MEDICATIONS  (STANDING):  heparin  Injectable 5000Unit(s) SubCutaneous every 12 hours  pantoprazole  Injectable 40milliGRAM(s) IV Push daily  metoprolol 50milliGRAM(s) Oral two times a day  baclofen 20milliGRAM(s) Oral two times a day  senna 2Tablet(s) Oral at bedtime  polyethylene glycol 3350 17Gram(s) Oral daily    MEDICATIONS  (PRN):      Allergies    No Known Allergies    Intolerances        Vital Signs Last 24 Hrs  T(C): 37.1, Max: 37.2 (01-12 @ 11:11)  T(F): 98.8, Max: 99 (01-12 @ 11:11)  HR: 116 (98 - 122)  BP: 129/65 (111/57 - 129/65)  BP(mean): --  RR: 16 (16 - 18)  SpO2: 99% (97% - 100%)    PHYSICAL EXAM:  General: NAD.  CVS: S1, S2  Chest: air entry bilaterally present  Abd: BS present, soft, non-tender      LABS:                        10.0   10.4  )-----------( 343      ( 13 Jan 2017 08:17 )             29.2     12 Jan 2017 12:19    144    |  104    |  3      ----------------------------<  120    2.9     |  34     |  0.46     Ca    8.4        12 Jan 2017 12:19        Pt presently stable - had BM  Continue present care

## 2017-01-13 NOTE — PROGRESS NOTE ADULT - PROBLEM SELECTOR PLAN 8
- pt contracted, cont on PT and OT  - heparin for DVT prophylaxis

## 2017-01-13 NOTE — PROGRESS NOTE ADULT - PROBLEM SELECTOR PROBLEM 1
Fecal impaction of colon

## 2017-01-13 NOTE — PROGRESS NOTE ADULT - PROBLEM SELECTOR PLAN 3
? pt does admit yes to dysuria -has trach   Now with bazzi catheter
? pt does admit yes to dysuria -has trach   Now with bazzi catheter
-  severe protein calories malnutrition  - encourage po intake, continue prosource supplementation
-  severe protein calories malnutrition  - encourage po intake, continue prosource supplementation
- likely severe protein calories malnutrition   possible secondary to underlying HIV  - dietary follow up, continue prosource supplementation
- likely severe protein calories malnutrition   possible secondary to underlying HIV  - dietary follow up, continue prosource supplementation
- pt contracted, cont on PT and OT  - heparin for DVT prophylaxis
- underlying HIV  - dietary follow up
pt contracted, will need PT and OT  no DVT, will be on DVT prophylaxis
- likely severe protein calories malnutrition   possible secondary to underlying HIV  - dietary follow up, continue prosource supplementation
pt contracted, will need PT and OT  no DVT, will be on DVT prophylaxis
? pt does admit yes to dysuria -has trach   Now with bazzi catheter

## 2017-05-03 NOTE — ED ADULT NURSE NOTE - PMH
Cerebrovascular accident (CVA), unspecified mechanism    Decubital ulcer    Essential hypertension    Tracheostomy in place    Uterine leiomyoma, unspecified location

## 2017-05-03 NOTE — ED PROVIDER NOTE - OBJECTIVE STATEMENT
Pertinent PMH/PSH/FHx/SHx and Review of Systems contained within:  Patient is a nonverbal female with history of CVA (bed bound for the last 18 years), HTN, chronic constipation, and tracheostomy status presents to the ED for worsening of her sacral ulcer.  Patient comes from home, is cared for by 4 attendants around the clock and a visiting wound care nurse.  Per wound care nurse patient's sacral ulcer has become severe and gotten to a point where she can no longer care for it so the patient was sent here.  Sister is at bedside, 90 yo mother is POA.  Patient not noted to have fevers at home.

## 2017-05-03 NOTE — ED ADULT NURSE REASSESSMENT NOTE - NS ED NURSE REASSESS COMMENT FT1
Patient with stage 4 decubiti encompassing Right buttock into the sacral area: wound base with necrotic slough and tunneling and undermining noted. Patient with stage 2 to Right lateral malleolus.  Unable to establish IV access after 2 venipunctures: lab work obtained and Sent. Dr. Segal made aware.

## 2017-05-03 NOTE — ED ADULT NURSE NOTE - OBJECTIVE STATEMENT
pt brought in with an unstageable pressure ulcer on the right up unto the sacrum l buttock. visible bone and muscle with wound bed necrotic. healing stage 2 on the left side. a right stage 2 on the malleolus bone. under  left foot is a dti.

## 2017-05-03 NOTE — ED PROVIDER NOTE - PHYSICAL EXAMINATION
Back: Right buttock - Stage 2 ulcer on sacrum extending to buttock, appears clean and dry Left buttock: Unstageable ulcer of sacrum and right buttock with complete necrosis of tissue and fat with visible bone and muscle

## 2017-05-03 NOTE — ED ADULT TRIAGE NOTE - CHIEF COMPLAINT QUOTE
evaluation of stage 4 sacral decubiti that has worsened , sent by visiting nurse  bedbound for 18 years, capped trach

## 2017-05-03 NOTE — ED PROVIDER NOTE - MEDICAL DECISION MAKING DETAILS
Patient with worsening ulcer of left buttock.  No fever or leukocytosis.  Antibiotics given for possible chronic osteomyelitis.  Will need wound care in morning.  Patient is to be admitted to the hospital and the case was discussed with the admitting physician.  Any changes in plan, additional imaging/labs, and further work up will be at the discretion of the admitting physician.  Patient remained stable in my care.

## 2017-05-04 PROBLEM — D25.9 LEIOMYOMA OF UTERUS, UNSPECIFIED: Chronic | Status: ACTIVE | Noted: 2017-01-01

## 2017-05-04 PROBLEM — I63.9 CEREBRAL INFARCTION, UNSPECIFIED: Chronic | Status: ACTIVE | Noted: 2017-01-01

## 2017-05-04 PROBLEM — I10 ESSENTIAL (PRIMARY) HYPERTENSION: Chronic | Status: ACTIVE | Noted: 2017-01-01

## 2017-05-04 NOTE — DIETITIAN INITIAL EVALUATION ADULT. - PHYSICAL APPEARANCE
BMI=17.4; no edema; Nutrition focused physical exam conducted ; found signs of malnutrition [ ]absent [x ]present.  Subcutaneous fat loss: [moderate ] Orbital fat pads region, [unable ]Buccal fat region, [severe ]Triceps region,  [moderate ]Ribs region.  Muscle wasting: [moderate ]Temples region, [severe ]Clavicle region, [severe ]Shoulder region, [unable ]Scapula region, [contracted ]Interosseous region,  [WNL ]thigh region, [WNL ]Calf region/emaciated/contracted/debilitated

## 2017-05-04 NOTE — CHART NOTE - NSCHARTNOTEFT_GEN_A_CORE
patient aseen and examined large ulcer Awiting ID consult and wound care eval   patient may need surgical debridement may need placement

## 2017-05-04 NOTE — H&P ADULT. - ASSESSMENT
70 y/o bed bound woman with contracture after CVA, appears chronically malnourished, has severe decubitus ulcer with possibly purulent discharge,

## 2017-05-04 NOTE — H&P ADULT. - HISTORY OF PRESENT ILLNESS
72 y/o female nonverbal, PMH CVA (bed bound for the last 18 years), HTN, chronic constipation, and tracheostomy,  presents to the ED for worsening of her sacral ulcer.  Patient comes from home, is cared for by 4 attendants around the clock and has a visiting wound care nurse.  Per wound care nurse patient's sacral ulcer has become severe and gotten to a point where she can no longer care for it, patient was sent here for evaluation. Decubitus reported as having possible purulent drainage. No report of fever or chills, no chest pain or abdominal pain, no nausea or vomiting, no dysuria, SOB,  cough or sore throat. Pt recently admitted here on 1/17, had UTI. Pt previously reported to be HIV positive; workup at that time showed no evidence of HIV. No further history obtainable.

## 2017-05-04 NOTE — CHART NOTE - NSCHARTNOTEFT_GEN_A_CORE
Upon Nutritional Assessment by the Registered Dietitian your patient was determined to meet criteria / has evidence of the following diagnosis/diagnoses:          [ ]  Mild Protein Calorie Malnutrition        [ ]  Moderate Protein Calorie Malnutrition        [x ] Severe Protein Calorie Malnutrition        [ ] Unspecified Protein Calorie Malnutrition        [x ] Underweight / BMI <19        [ ] Morbid Obesity / BMI > 40      Findings as based on:  •  Comprehensive nutrition assessment and consultation  •  Calorie counts (nutrient intake analysis)  •  Food acceptance and intake status from observations by staff  •  Follow up  •  Patient education  •  Intervention secondary to interdisciplinary rounds  •   concerns      Treatment:    The following diet has been recommended:  Advance po diet & supplements pending swallow evaluation    PROVIDER Section:     By signing this assessment you are acknowledging and agree with the diagnosis/diagnoses assigned by the Registered Dietitian    Comments:

## 2017-05-04 NOTE — DIETITIAN INITIAL EVALUATION ADULT. - PERTINENT LABORATORY DATA
05-03 Na 139 mmol/L Glu 105 mg/dL<H> K+ 3.9 mmol/L Cr  0.43 mg/dL<L> BUN 13 mg/dL Phos n/a   Alb 1.4 g/dL<L> PAB n/a   Hgb 9.9 g/dL<L> Hct 31.4 %<L>

## 2017-05-04 NOTE — DIETITIAN INITIAL EVALUATION ADULT. - OTHER INFO
Pt seen for MD & RN consult 5/4 for pressure ulcer & assessment & calorie count. Pt currently NPO pending current swallow evaluation. Pt with tracheostomy s/p swallow evaluation 1/6 recommends puree/nectar thick liquids.  Pt nonverbal unable to obtain diet hx PTA. Wt remains stable x 4 months since last hospitalization. Pt seen for MD & RN consult 5/4 for pressure ulcer & assessment & calorie count. Pt currently NPO pending current swallow evaluation. Pt with tracheostomy s/p swallow evaluation 1/6 recommends puree/nectar thick liquids.  Pt nonverbal unable to obtain diet hx PTA. Wt remains stable x 4 months since last hospitalization. Unable to start calorie count @ this time due to NPO.

## 2017-05-04 NOTE — H&P ADULT. - RS GEN PE MLT RESP DETAILS PC
no rhonchi/no rales/airway patent/no wheezes/good air movement/breath sounds equal/clear to auscultation bilaterally/respirations non-labored

## 2017-05-04 NOTE — CONSULT NOTE ADULT - SUBJECTIVE AND OBJECTIVE BOX
Infectious Diseases - Attending at Dr. Collazo    HPI:  70 y/o female nonverbal, PMH CVA (bed bound for the last 18 years), HTN, chronic constipation, and tracheostomy,  presents to the ED for worsening of her sacral ulcer.  Patient comes from home, is cared for by 4 attendants around the clock and has a visiting wound care nurse.  Per wound care nurse patient's sacral ulcer has become severe and gotten to a point where she can no longer care for it, patient was sent here for evaluation. Decubitus reported as having possible purulent drainage. No report of fever or chills, no chest pain or abdominal pain, no nausea or vomiting, no dysuria, SOB,  cough or sore throat. Pt recently admitted here on , had UTI. Pt previously reported to be HIV positive; workup at that time showed no evidence of HIV. No further history obtainable. (04 May 2017 01:26)         PAST MEDICAL & SURGICAL HISTORY:  Decubital ulcer  Tracheostomy in place  Uterine leiomyoma, unspecified location  HIV (human immunodeficiency virus infection)  Essential hypertension  Cerebrovascular accident (CVA), unspecified mechanism  HIV (human immunodeficiency virus infection)  H/O knee surgery      Allergies    No Known Allergies    Intolerances        FAMILY HISTORY:  No pertinent family history in first degree relatives      SOCIAL HISTORY:  no smoking, alcoholism    REVIEW OF SYSTEMS:    as per bao BLOCK non verbal       MEDICATIONS  (STANDING):  psyllium Powder 1Packet(s) Oral daily  polyethylene glycol 3350 17Gram(s) Oral daily  baclofen 20milliGRAM(s) Oral two times a day  multivitamin 1Tablet(s) Oral daily  heparin  Injectable 5000Unit(s) SubCutaneous every 12 hours  piperacillin/tazobactam IVPB. 3.375Gram(s) IV Intermittent every 8 hours  vancomycin  IVPB 1000milliGRAM(s) IV Intermittent every 12 hours    MEDICATIONS  (PRN):      Vital Signs Last 24 Hrs  T(C): 36.1, Max: 36.9 (05-04 @ 04:30)  T(F): 97, Max: 98.4 (05-04 @ 04:30)  HR: 98 (98 - 119)  BP: 98/60 (98/60 - 103/65)  BP(mean): --  RR: 19 (16 - 20)  SpO2: 98% (95% - 100%)    PHYSICAL EXAM:    Constitutional: NAD, cachectic,contractures+  HEENT: PERRL  Neck: supple  Respiratory: CTAB/L.s/p trach with collar  Cardiovascular: S1 and S2, RRR, no M/G/R  Gastrointestinal: BS+, soft, NT/ND  Extremities: No peripheral edema  Vascular: 2+ peripheral pulses  Neurological: moves all extremities  Skin/back : stage 3 sacral decub  right sided draining purulent drainage,      LABS:                        9.2    8.7   )-----------( 458      ( 04 May 2017 10:48 )             27.4         138  |  104  |  11  ----------------------------<  92  3.7   |  27  |  0.33<L>    Ca    7.7<L>      04 May 2017 10:48    TPro  5.9<L>  /  Alb  1.2<L>  /  TBili  0.4  /  DBili  x   /  AST  19  /  ALT  12  /  AlkPhos  166<H>      PT/INR - ( 03 May 2017 20:56 )   PT: 13.0 sec;   INR: 1.19 ratio         PTT - ( 03 May 2017 20:56 )  PTT:29.0 sec  Urinalysis Basic - ( 04 May 2017 01:56 )    Color: Yellow / Appearance: Clear / S.020 / pH: x  Gluc: x / Ketone: Trace  / Bili: Negative / Urobili: 1 mg/dL   Blood: x / Protein: 30 mg/dL / Nitrite: Negative   Leuk Esterase Trace / RBC: 6-10 /HPF / WBC 3-5   Sq Epi: x / Non Sq Epi: Moderate / Bacteria: Moderate    MICROBIOLOGY:  RECENT CULTURES:        RADIOLOGY & ADDITIONAL STUDIES:    EXAM:  CHEST SINGLE VIEW                            PROCEDURE DATE:  2017        INTERPRETATION:  Portable chest x-ray    Indication: Bed sore.  Admission chest x-ray.    Portable chest x-ray is compared to a previous examination dated 2017.    Impression: No evidence for pulmonary consolidation, pleural effusion or   pneumothorax.    Small linear atelectasis or scar in the left lung.    Stable elevation of the left hemidiaphragm.    Stable tracheostomy.    The cardiac silhouette is within normal limits.    Stable dextroscoliosis:

## 2017-05-05 NOTE — PHYSICAL THERAPY INITIAL EVALUATION ADULT - RANGE OF MOTION EXAMINATION, REHAB EVAL
contracted on both distal upper and lower limbs; knees limited to <30 degrees of passive flexion; claw hand deformities and scoliosis/deficits as listed below

## 2017-05-05 NOTE — PHYSICAL THERAPY INITIAL EVALUATION ADULT - ADDITIONAL COMMENTS
As per patient, lives c mother in private house. Owns a hospital bed and wheelchair. Patient a poor historian. Unclear if also has a patient mechanical  (Arjo). Has 4 HHAs 7 days a week. Seen by RN once a month. Attempted to corroborate these with family member but no answer to phone number listed on patient's demographic info (Fabiola, parent).

## 2017-05-05 NOTE — PHYSICAL THERAPY INITIAL EVALUATION ADULT - MODIFIED CLINICAL TEST OF SENSORY INTEGRATION IN BALANCE TEST
Barthel Index: Feeding Score _0 __, Bathing Score _0__, Grooming Score _0__, Dressing Score _0__, Bowels Score _0__, Bladder Score _0__, Toilet Score _0__, Transfers Score _0__, Mobility Score _0__, Stairs Score _0__,     Total Score _0__

## 2017-05-05 NOTE — PROGRESS NOTE ADULT - SUBJECTIVE AND OBJECTIVE BOX
Patient is a 71y old  Female who presents with a chief complaint of Worsening of sacral ulcer. (04 May 2017 01:26)      INTERVAL HPI / OVERNIGHT EVENTS: no new events, denies fever, abdominal  pain     MEDICATIONS  (STANDING):  psyllium Powder 1Packet(s) Oral daily  polyethylene glycol 3350 17Gram(s) Oral daily  baclofen 20milliGRAM(s) Oral two times a day  multivitamin 1Tablet(s) Oral daily  heparin  Injectable 5000Unit(s) SubCutaneous every 12 hours  piperacillin/tazobactam IVPB. 3.375Gram(s) IV Intermittent every 8 hours  vancomycin  IVPB 1000milliGRAM(s) IV Intermittent every 12 hours  dextrose 5% + sodium chloride 0.9%. 1000milliLiter(s) IV Continuous <Continuous>  collagenase Ointment 1Application(s) Topical two times a day    MEDICATIONS  (PRN):  morphine  - Injectable 2milliGRAM(s) IV Push every 4 hours PRN Moderate Pain (4 - 6)      Vital Signs Last 24 Hrs  T(C): 36.5, Max: 37.1 ( @ 05:21)  T(F): 97.7, Max: 98.8 ( @ 05:21)  HR: 112 (102 - 119)  BP: 125/55 (102/58 - 130/58)  BP(mean): --  RR: 17 (16 - 18)  SpO2: 100% (96% - 100%)    PHYSICAL EXAM:  General :NAD  Constitutional: contractures+,cachectic  Neck: trach collar+  Respiratory: CTAB/L  Cardiovascular: S1 and S2, RRR, no M/G/R  Gastrointestinal: BS+, soft, NT/ND  Extremities: No peripheral edema  Vascular: 2+ peripheral pulses  Skin: sacral decub with purulent drainage+      LABS:  ESR 44  PCT 34.27                        8.8    8.9   )-----------( 483      ( 05 May 2017 08:32 )             26.8         139  |  103  |  10  ----------------------------<  124<H>  3.7   |  29  |  0.50    Ca    7.7<L>      05 May 2017 08:32    TPro  5.9<L>  /  Alb  1.2<L>  /  TBili  0.4  /  DBili  x   /  AST  19  /  ALT  12  /  AlkPhos  166<H>      Urinalysis Basic - ( 04 May 2017 01:56 )    Color: Yellow / Appearance: Clear / S.020 / pH: x  Gluc: x / Ketone: Trace  / Bili: Negative / Urobili: 1 mg/dL   Blood: x / Protein: 30 mg/dL / Nitrite: Negative   Leuk Esterase: Trace / RBC: 6-10 /HPF / WBC 3-5   Sq Epi: x / Non Sq Epi: Moderate / Bacteria: Moderate          MICROBIOLOGY:  RECENT CULTURES:   .Blood Blood-Peripheral XXXX XXXX   No growth to date.     .Blood Blood-Peripheral XXXX XXXX   No growth to date.          RADIOLOGY & ADDITIONAL STUDIES:

## 2017-05-05 NOTE — SWALLOW BEDSIDE ASSESSMENT ADULT - ORAL PREPARATORY PHASE
Reduced oral grading pt with difficulty drawing liquid into the straw at times pt with difficulty drawing liquid into the straw- building intra oral pressure pt with minimal difficulty drawing liquid into the straw

## 2017-05-05 NOTE — CONSULT NOTE ADULT - SUBJECTIVE AND OBJECTIVE BOX
Chief Complaint:  Patient is a 71y old  Female who presents with a chief complaint of Worsening of sacral ulcer.     HPI:  72 y/o female nonverbal, PMH HIV,CVA (bed bound for the last 18 years), HTN, chronic constipation, and tracheostomy,Decubitus ulcer,uterine leionyoma, h/o knee sx  presents to the ED for worsening of her sacral ulcer.  Patient comes from home, is cared for by 4 attendants around the clock and has a visiting wound care nurse.  Per wound care nurse patient's sacral ulcer has become severe and gotten to a point where she can no longer care for it, patient was sent here for evaluation. Decubitus reported as having possible purulent drainage. No report of fever or chills, no chest pain or abdominal pain, no nausea or vomiting, no dysuria, SOB,  cough or sore throat. Pt recently admitted here on , had UTI. Pt previously reported to be HIV positive; workup at that time showed no evidence of HIV. No further history obtainable.  We were called for consult on decubitus ulcer. PT examined with Dr. Buchanan.     No Known Allergies    psyllium Powder 1Packet(s) Oral daily  polyethylene glycol 3350 17Gram(s) Oral daily  baclofen 20milliGRAM(s) Oral two times a day  multivitamin 1Tablet(s) Oral daily  heparin  Injectable 5000Unit(s) SubCutaneous every 12 hours  piperacillin/tazobactam IVPB. 3.375Gram(s) IV Intermittent every 8 hours  vancomycin  IVPB 1000milliGRAM(s) IV Intermittent every 12 hours  dextrose 5% + sodium chloride 0.9%. 1000milliLiter(s) IV Continuous <Continuous>  morphine  - Injectable 2milliGRAM(s) IV Push every 4 hours PRN  collagenase Ointment 1Application(s) Topical two times a day    Review of Systems: Pt is unable to contribute and is a poor historian.     Physical Exam:    Vital Signs:  Vital Signs Last 24 Hrs  T(C): 36.5, Max: 37.1 (05-05 @ 05:21)  T(F): 97.7, Max: 98.8 (05-05 @ 05:21)  HR: 113 (98 - 119)  BP: 118/70 (98/60 - 130/58)  BP(mean): --  RR: 17 (16 - 19)  SpO2: 100% (96% - 100%)  Daily       Physical Exam:    General: Bedbound, appears stated age, ill-appearing,cachetic, in  no apparent distress  HEENT:  Tracheostomy tube patent and intact. No JVD  Chest:  Full & symmetric excursion, no increased effort, breath sounds clear  Cardiovascular:  Regular rhythm, S1, S2, no murmur/rub/S3/S4, no abdominal bruit, no edema  Abdomen:  Soft, non-tender, non-distended, normoactive bowel sounds,  no masses   Extremities: Contracted upper and lower extremities,  no cyanosis,clubbing or edema  Skin: Stage 4 sacral decubitus ulcer. Wound dry covered with meplex dressing.   Neuro/Psych: Not oriented       Laboratory:                      8.8    8.9   )-----------( 483      ( 05 May 2017 08:32 )             26.8       139  |  103  |  10  ----------------------------<  124<H>  3.7   |  29  |  0.50    Ca    7.7<L>      05 May 2017 08:32    TPro  5.9<L>  /  Alb  1.2<L>  /  TBili  0.4  /  DBili  x   /  AST  19  /  ALT  12  /  AlkPhos  166<H>      LIVER FUNCTIONS - ( 04 May 2017 10:48 )  Alb: 1.2 g/dL / Pro: 5.9 gm/dL / ALK PHOS: 166 U/L / ALT: 12 U/L / AST: 19 U/L / GGT: x           PT/INR - ( 03 May 2017 20:56 )   PT: 13.0 sec;   INR: 1.19 ratio         PTT - ( 03 May 2017 20:56 )  PTT:29.0 sec  Urinalysis Basic - ( 04 May 2017 01:56 )    Color: Yellow / Appearance: Clear / S.020 / pH: x  Gluc: x / Ketone: Trace  / Bili: Negative / Urobili: 1 mg/dL   Blood: x / Protein: 30 mg/dL / Nitrite: Negative   Leuk Esterase: Trace / RBC: 6-10 /HPF / WBC 3-5   Sq Epi: x / Non Sq Epi: Moderate / Bacteria: Moderate    Imaging:  EXAM:  KUB    5/4/17     IMPRESSION:    No bowel obstruction.  Stool throughout the colon.      Assessment: 72 y/o female presenting with stage 4 sacral decubitus ulcer.       Plan:     -Begin daily dressings with hydrogel impregnated gauze (Restore)  -No surgical intervention or debridement needed at this time  -Continue medical management  -continue abx   -will follow   -discussed with

## 2017-05-05 NOTE — SWALLOW BEDSIDE ASSESSMENT ADULT - SPECIFY REASON(S)
MD order states pt with trach was eating at home. trach capped MD order states pt with trach was eating at home. as per documentation the trach is capped during the day while at home and pt eats with the trach capped.

## 2017-05-05 NOTE — PHYSICAL THERAPY INITIAL EVALUATION ADULT - PERTINENT HX OF CURRENT PROBLEM, REHAB EVAL
Patient brought in due to wound concerns by visiting nurse at home. Chart reviewed and noted UTI on urinalysis. PT wound care initial evaluation performed with all wounds documented in flowsheet 2 4.0 A&I.

## 2017-05-05 NOTE — SWALLOW BEDSIDE ASSESSMENT ADULT - COMMENTS
CXR 5/3/2017 Impression: No evidence for pulmonary consolidation, pleural effusion or pneumothorax. Small linear atelectasis or scar in the left lung. Stable elevation of the left hemidiaphragm. Stable tracheostomy. The cardiac silhouette is within normal limits. Stable dextroscoliosis.

## 2017-05-05 NOTE — SWALLOW BEDSIDE ASSESSMENT ADULT - SWALLOW EVAL: DIAGNOSIS
pt presented with oropharyngeal dysphagia marked by reduce intra oral pressure- drawing liquid into the straw, slow oral transport with puree, suspect delay in swallow trigger with reduced laryngeal elevation. no overt signs of aspiration.

## 2017-05-05 NOTE — PHYSICAL THERAPY INITIAL EVALUATION ADULT - GENERAL OBSERVATIONS, REHAB EVAL
Patient see supine in bed. Noted capped tracheostomy, c ability to verbalize (although c difficulty to understand due to tracheostomy impairing normal sound production). 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 Stable vital signs as charted. Reports pain on right leg c right ace wrap on right knee. Removed and no open skin areas, deformities at knee or increased heat to palpation. Patient see supine in bed. Noted capped tracheostomy, c ability to verbalize (although c difficulty to understand due to tracheostomy impairing normal sound production). 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 Stable vital signs as charted. Reports pain on right leg c right ace wrap on right knee. Removed and no open skin areas, deformities at knee or increased heat to palpation. Patient see supine in bed. Noted bazzi catheter and capped tracheostomy, c ability to verbalize (although c difficulty to understand due to tracheostomy impairing normal sound production). 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 Stable vital signs as charted. Reports pain on right leg c right ace wrap on right knee. Removed and no open skin areas, deformities at knee or increased heat to palpation.

## 2017-05-05 NOTE — PHYSICAL THERAPY INITIAL EVALUATION ADULT - SKIN INTEGRITY
pressure ulcer/PT wound care initial evaluation performed with all wounds documented in flowsheet 2 4.0 A&I

## 2017-05-05 NOTE — PROGRESS NOTE ADULT - SUBJECTIVE AND OBJECTIVE BOX
Patient is a 71y old  Female who presents with a chief complaint of Worsening of sacral ulcer. (04 May 2017 01:26)      INTERVAL HPI/OVERNIGHT EVENTS: gia cute events overnight     MEDICATIONS  (STANDING):  psyllium Powder 1Packet(s) Oral daily  polyethylene glycol 3350 17Gram(s) Oral daily  baclofen 20milliGRAM(s) Oral two times a day  multivitamin 1Tablet(s) Oral daily  heparin  Injectable 5000Unit(s) SubCutaneous every 12 hours  piperacillin/tazobactam IVPB. 3.375Gram(s) IV Intermittent every 8 hours  vancomycin  IVPB 1000milliGRAM(s) IV Intermittent every 12 hours  dextrose 5% + sodium chloride 0.9%. 1000milliLiter(s) IV Continuous <Continuous>    MEDICATIONS  (PRN):      Allergies    No Known Allergies    Intolerances        REVIEW OF SYSTEMS:  CONSTITUTIONAL: No fever, weight loss, or fatigue  EYES: No eye pain, visual disturbances, or discharge  ENMT:  No difficulty hearing, tinnitus, vertigo; No sinus or throat pain  NECK: No pain or stiffness  BREASTS: No pain, masses, or nipple discharge  RESPIRATORY: No cough, wheezing, chills or hemoptysis; No shortness of breath  CARDIOVASCULAR: No chest pain, palpitations, dizziness, or leg swelling  GASTROINTESTINAL: No abdominal or epigastric pain. No nausea, vomiting, or hematemesis; No diarrhea or constipation. No melena or hematochezia.  GENITOURINARY: No dysuria, frequency, hematuria, or incontinence  NEUROLOGICAL: No headaches, memory loss, loss of strength, numbness, or tremors  SKIN: No itching, burning, rashes, or lesions   LYMPH NODES: No enlarged glands  ENDOCRINE: No heat or cold intolerance; No hair loss  MUSCULOSKELETAL: No joint pain or swelling; No muscle, back, or extremity pain  PSYCHIATRIC: No depression, anxiety, mood swings, or difficulty sleeping  HEME/LYMPH: No easy bruising, or bleeding gums  ALLERGY AND IMMUNOLOGIC: No hives or eczema    Vital Signs Last 24 Hrs  T(C): 36.5, Max: 37.1 (05-05 @ 05:21)  T(F): 97.7, Max: 98.8 (05-05 @ 05:21)  HR: 113 (98 - 119)  BP: 118/70 (98/60 - 130/58)  BP(mean): --  RR: 17 (16 - 19)  SpO2: 100% (96% - 100%)    PHYSICAL EXAM:  GENERAL: NAD, well-groomed, well-developed  HEAD:  Atraumatic, Normocephalic  EYES: EOMI, PERRLA, conjunctiva and sclera clear  ENMT: No tonsillar erythema, exudates, or enlargement; Moist mucous membranes, Good dentition, No lesions  NECK: Supple, No JVD, Normal thyroid  NERVOUS SYSTEM:  Alert & Oriented X3, Good concentration; Motor Strength 5/5 B/L upper and lower extremities; DTRs 2+ intact and symmetric  CHEST/LUNG: Clear to percussion bilaterally; No rales, rhonchi, wheezing, or rubs  HEART: Regular rate and rhythm; No murmurs, rubs, or gallops  ABDOMEN: Soft, Nontender, Nondistended; Bowel sounds present  EXTREMITIES:  2+ Peripheral Pulses, No clubbing, cyanosis, or edema  LYMPH: No lymphadenopathy noted  SKIN: No rashes or lesions    LABS:                        8.8    8.9   )-----------( 483      ( 05 May 2017 08:32 )             26.8         139  |  103  |  10  ----------------------------<  124<H>  3.7   |  29  |  0.50    Ca    7.7<L>      05 May 2017 08:32    TPro  5.9<L>  /  Alb  1.2<L>  /  TBili  0.4  /  DBili  x   /  AST  19  /  ALT  12  /  AlkPhos  166<H>  0504    PT/INR - ( 03 May 2017 20:56 )   PT: 13.0 sec;   INR: 1.19 ratio         PTT - ( 03 May 2017 20:56 )  PTT:29.0 sec  Urinalysis Basic - ( 04 May 2017 01:56 )    Color: Yellow / Appearance: Clear / S.020 / pH: x  Gluc: x / Ketone: Trace  / Bili: Negative / Urobili: 1 mg/dL   Blood: x / Protein: 30 mg/dL / Nitrite: Negative   Leuk Esterase: Trace / RBC: 6-10 /HPF / WBC 3-5   Sq Epi: x / Non Sq Epi: Moderate / Bacteria: Moderate      CAPILLARY BLOOD GLUCOSE      RADIOLOGY & ADDITIONAL TESTS:    Imaging Personally Reviewed:  [ X] YES  [ ] NO    Consultant(s) Notes Reviewed:  [X ] YES  [ ] NO    Care Discussed with Consultants/Other Providers [X ] YES  [ ] NO

## 2017-05-05 NOTE — SWALLOW BEDSIDE ASSESSMENT ADULT - SWALLOW EVAL: RECOMMENDED FEEDING/EATING TECHNIQUES
allow for swallow between intakes/crush medication (when feasible)/small sips/bites/maintain upright posture during/after eating for 30 mins

## 2017-05-05 NOTE — SWALLOW BEDSIDE ASSESSMENT ADULT - SLP GENERAL OBSERVATIONS
pt seen bedside, alert and oriented to self and place. pt responded to questions, she verbalized wants and needs and was able to follow simple directions. pt with a size  trach, cuffless and capped. she is able to get voice and speech dysarthric with poor to fair intelligibility.

## 2017-05-05 NOTE — SWALLOW BEDSIDE ASSESSMENT ADULT - SLP PERTINENT HISTORY OF CURRENT PROBLEM
Patient is a nonverbal female with history of CVA (bed bound for the last 18 years), HTN, chronic constipation, and tracheostomy status presents to the ED for worsening of her sacral ulcer.  Patient comes from home, is cared for by 4 attendants around the clock and a visiting wound care nurse.  Per wound care nurse patient's sacral ulcer has become severe and gotten to a point where she can no longer care for it so the patient was sent here.  Sister is at bedside, 92 yo mother is POA.  Patient not noted to have fevers at home.

## 2017-05-06 NOTE — PROGRESS NOTE ADULT - SUBJECTIVE AND OBJECTIVE BOX
HPI:  72 y/o female nonverbal, PMH CVA (bed bound for the last 18 years), HTN, chronic constipation, and tracheostomy,  presents to the ED for worsening of her sacral ulcer.  Patient comes from home, is cared for by 4 attendants around the clock and has a visiting wound care nurse.  Per wound care nurse patient's sacral ulcer has become severe and gotten to a point where she can no longer care for it, patient was sent here for evaluation. Decubitus reported as having possible purulent drainage. No report of fever or chills, no chest pain or abdominal pain, no nausea or vomiting, no dysuria, SOB,  cough or sore throat. Pt recently admitted here on 1/17, had UTI. Pt previously reported to be HIV positive; workup at that time showed no evidence of HIV. No further history obtainable. (04 May 2017 01:26)      PAST MEDICAL & SURGICAL HISTORY:  Decubital ulcer  Tracheostomy in place  Uterine leiomyoma, unspecified location  HIV (human immunodeficiency virus infection)  Essential hypertension  Cerebrovascular accident (CVA), unspecified mechanism  HIV (human immunodeficiency virus infection)  H/O knee surgery      REVIEW OF SYSTEMS:    discmfort icn sacral region patient able to give yes annd no nods all other systems negative     MEDICATIONS  (STANDING):  psyllium Powder 1Packet(s) Oral daily  polyethylene glycol 3350 17Gram(s) Oral daily  baclofen 20milliGRAM(s) Oral two times a day  multivitamin 1Tablet(s) Oral daily  heparin  Injectable 5000Unit(s) SubCutaneous every 12 hours  piperacillin/tazobactam IVPB. 3.375Gram(s) IV Intermittent every 8 hours  vancomycin  IVPB 1000milliGRAM(s) IV Intermittent every 12 hours  collagenase Ointment 1Application(s) Topical two times a day  dextrose 5% + sodium chloride 0.9%. 1000milliLiter(s) IV Continuous <Continuous>    MEDICATIONS  (PRN):  morphine  - Injectable 2milliGRAM(s) IV Push every 4 hours PRN Moderate Pain (4 - 6)      Allergies    No Known Allergies    Intolerances        SOCIAL HISTORY:    FAMILY HISTORY:  No pertinent family history in first degree relatives      Vital Signs Last 24 Hrs  T(C): 37, Max: 37.7 (05-05 @ 23:46)  T(F): 98.6, Max: 99.8 (05-05 @ 23:46)  HR: 121 (88 - 132)  BP: 97/64 (97/64 - 118/62)  BP(mean): --  RR: 16 (16 - 16)  SpO2: 96% (96% - 99%)    PHYSICAL EXAM:    GENERAL: NAD, well-groomed, well-developed  HEAD:  Atraumatic, Normocephalic  EYES: EOMI, PERRLA, conjunctiva and sclera clear  ENMT: No tonsillar erythema, exudates, or enlargement; Moist mucous membranes, Good dentition, No lesions  NECK:trach   NERVOUS SYSTEM:  Alert & Oriented X3, Good concentration; Motor Strength 5/5 B/L upper and lower extremities; DTRs 2+ intact and symmetric  CHEST/LUNG: Clear to percussion bilaterally; No rales, rhonchi, wheezing, or rubs  HEART: Regular rate and rhythm; No murmurs, rubs, or gallops  ABDOMEN: stage four sacral ulcer   EXTREMITIES:  2+ Peripheral Pulses, No clubbing, cyanosis, or edema  LYMPH: No lymphadenopathy noted  SKIN: No rashes or lesions      LABS:                        9.5    8.7   )-----------( 513      ( 06 May 2017 05:09 )             28.2     05-06    144  |  108  |  15  ----------------------------<  124<H>  4.0   |  27  |  0.87    Ca    7.6<L>      06 May 2017 05:09    TPro  5.8<L>  /  Alb  1.2<L>  /  TBili  0.4  /  DBili  x   /  AST  26  /  ALT  10<L>  /  AlkPhos  159<H>  05-06          RADIOLOGY & ADDITIONAL STUDIES:

## 2017-05-07 NOTE — PROGRESS NOTE ADULT - SUBJECTIVE AND OBJECTIVE BOX
Patient is a 71y old  Female who presents with a chief complaint of Worsening of sacral ulcer. (04 May 2017 01:26)      INTERVAL HPI/OVERNIGHT EVENTS:    MEDICATIONS  (STANDING):  psyllium Powder 1Packet(s) Oral daily  polyethylene glycol 3350 17Gram(s) Oral daily  baclofen 20milliGRAM(s) Oral two times a day  multivitamin 1Tablet(s) Oral daily  heparin  Injectable 5000Unit(s) SubCutaneous every 12 hours  piperacillin/tazobactam IVPB. 3.375Gram(s) IV Intermittent every 8 hours  vancomycin  IVPB 1000milliGRAM(s) IV Intermittent every 12 hours  collagenase Ointment 1Application(s) Topical two times a day  dextrose 5% + sodium chloride 0.9%. 1000milliLiter(s) IV Continuous <Continuous>    MEDICATIONS  (PRN):  morphine  - Injectable 2milliGRAM(s) IV Push every 4 hours PRN Moderate Pain (4 - 6)      Allergies    No Known Allergies    Intolerances        REVIEW OF SYSTEMS: dificult to obtain will cry out in pain but not to specific exam except for sacrum     Vital Signs Last 24 Hrs  T(C): 36.7, Max: 36.7 (05-07 @ 11:19)  T(F): 98, Max: 98 (05-07 @ 11:19)  HR: 116 (116 - 116)  BP: 106/66 (106/66 - 106/66)  BP(mean): --  RR: 15 (15 - 15)  SpO2: 87% (87% - 87%)    PHYSICAL EXAM:  GENERAL:mild distress contracted   HEAD:  Atraumatic, Normocephalic  EYES: EOMI, PERRLA, conjunctiva and sclera clear  ENMT: No tonsillar erythema, exudates, or enlargement; Moist mucous membranes, Good dentition, No lesions  NECK:trach capped  NERVOUS SYSTEM:  Alert & Oriented X3,   CHEST/LUNG: Clear to percussion bilaterally; No rales, rhonchi, wheezing, or rubs  HEART: Regular rate and rhythm; No murmurs, rubs, or gallops  ABDOMEN: Soft, Nontender, Nondistended; Bowel sounds present  decub ulcer PEG   EXTREMITIES:  2+ Peripheral Pulses, No clubbing, cyanosis, or edema  LYMPH: No lymphadenopathy noted  SKIN: No rashes or lesions    LABS:                        9.5    8.7   )-----------( 513      ( 06 May 2017 05:09 )             28.2     05-06    144  |  108  |  15  ----------------------------<  124<H>  4.0   |  27  |  0.87    Ca    7.6<L>      06 May 2017 05:09    TPro  5.8<L>  /  Alb  1.2<L>  /  TBili  0.4  /  DBili  x   /  AST  26  /  ALT  10<L>  /  AlkPhos  159<H>  05-06        CAPILLARY BLOOD GLUCOSE      RADIOLOGY & ADDITIONAL TESTS:    Imaging Personally Reviewed:  [X ] YES  [ ] NO    Consultant(s) Notes Reviewed:  [X ] YES  [ ] NO    Care Discussed with Consultants/Other Providers [X ] YES  [ ] NO

## 2017-05-08 NOTE — PROGRESS NOTE ADULT - SUBJECTIVE AND OBJECTIVE BOX
Patient is a 71y old  Female who presents with a chief complaint of Worsening of sacral ulcer. (04 May 2017 01:26)      INTERVAL HPI/OVERNIGHT EVENTS:    MEDICATIONS  (STANDING):  psyllium Powder 1Packet(s) Oral daily  polyethylene glycol 3350 17Gram(s) Oral daily  baclofen 20milliGRAM(s) Oral two times a day  multivitamin 1Tablet(s) Oral daily  heparin  Injectable 5000Unit(s) SubCutaneous every 12 hours  piperacillin/tazobactam IVPB. 3.375Gram(s) IV Intermittent every 8 hours  vancomycin  IVPB 1000milliGRAM(s) IV Intermittent every 12 hours  collagenase Ointment 1Application(s) Topical two times a day  dextrose 5% + sodium chloride 0.9%. 1000milliLiter(s) IV Continuous <Continuous>    MEDICATIONS  (PRN):  morphine  - Injectable 2milliGRAM(s) IV Push every 4 hours PRN Moderate Pain (4 - 6)      Allergies    No Known Allergies    Intolerances        REVIEW OF SYSTEMS:  Complains of pain of  lower back  hungry     Vital Signs Last 24 Hrs  T(C): 36.7, Max: 37.1 (05-07 @ 18:10)  T(F): 98.1, Max: 98.7 (05-07 @ 18:10)  HR: 103 (89 - 103)  BP: 112/55 (112/55 - 130/41)  BP(mean): --  RR: 15 (15 - 16)  SpO2: 100% (96% - 100%)    PHYSICAL EXAM:  GENERAL: NAD, contracted extremities   HEAD:  Atraumatic, sunken mouth   EYES: EOMI, PERRLA, conjunctiva and sclera clear  ENMT: No tonsillar erythema, exudates, or enlargement; Moist mucous membranes, Good dentition, No lesions  NECK: trach capped   NERVOUS SYSTEM:  Alert & Oriented X3, CHEST/LUNG: Clear to percussion bilaterally; No rales, rhonchi, wheezing, or rubs  HEART: Regular rate and rhythm; No murmurs, rubs, or gallops  ABDOMEN: Soft, Nontender, Nondistended; Bowel sounds present  EXTREMITIES:  2+ Peripheral Pulses, No clubbing, cyanosis, or edema  LYMPH: No lymphadenopathy noted  SKIN: sacral unstageable decub   LABS:                        9.1    13.7  )-----------( 460      ( 08 May 2017 07:21 )             27.2     05-08    147<H>  |  113<H>  |  11  ----------------------------<  112<H>  3.7   |  25  |  1.01    Ca    7.8<L>      08 May 2017 07:21          CAPILLARY BLOOD GLUCOSE      RADIOLOGY & ADDITIONAL TESTS:    Imaging Personally Reviewed:  [ ] YES  [ ] NO    Consultant(s) Notes Reviewed:  [ ] YES  [ ] NO    Care Discussed with Consultants/Other Providers [ ] YES  [ ] NO

## 2017-05-08 NOTE — PROGRESS NOTE ADULT - SUBJECTIVE AND OBJECTIVE BOX
Patient is a 71y old  Female who presents with a chief complaint of Worsening of sacral ulcer. (04 May 2017 01:26)      INTERVAL HPI / OVERNIGHT EVENTS: no new events    MEDICATIONS  (STANDING):  psyllium Powder 1Packet(s) Oral daily  polyethylene glycol 3350 17Gram(s) Oral daily  baclofen 20milliGRAM(s) Oral two times a day  multivitamin 1Tablet(s) Oral daily  heparin  Injectable 5000Unit(s) SubCutaneous every 12 hours  piperacillin/tazobactam IVPB. 3.375Gram(s) IV Intermittent every 8 hours  vancomycin  IVPB 1000milliGRAM(s) IV Intermittent every 12 hours  collagenase Ointment 1Application(s) Topical two times a day  dextrose 5% + sodium chloride 0.9%. 1000milliLiter(s) IV Continuous <Continuous>  potassium chloride    Tablet ER 40milliEquivalent(s) Oral every 4 hours    MEDICATIONS  (PRN):  morphine  - Injectable 2milliGRAM(s) IV Push every 4 hours PRN Moderate Pain (4 - 6)  acetaminophen   Tablet 650milliGRAM(s) Oral every 6 hours PRN For Temp greater than 38 C (100.4 F)      Vital Signs Last 24 Hrs  Tmax  HR: 103 (102 - 121)  BP: 154/73 (112/55 - 154/73)  BP(mean): --  RR: 18 (15 - 18)  SpO2: 96% (96% - 100%)    PHYSICAL EXAM:  General :NAD  Constitutional: contractures, functional quadriplegia  Respiratory: CTAB/L,s/p trach collar  Cardiovascular: S1 and S2, RRR, no M/G/R  Gastrointestinal: BS+, soft, NT/ND  Extremities: No peripheral edema  Vascular: 2+ peripheral pulses  Skin:sacral decubb with purulent drainge      LABS:             wbc 87-->13.7  Cr 1.01  PCT 9            MICROBIOLOGY:  RECENT CULTURES:  05-04 .Blood Blood-Peripheral XXXX XXXX   No growth to date.    05-04 .Blood Blood-Peripheral XXXX XXXX   No growth to date.          RADIOLOGY & ADDITIONAL STUDIES:

## 2017-05-08 NOTE — PROGRESS NOTE ADULT - PROBLEM SELECTOR PLAN 3
wound care may be canidate for vac dressing PT wound to consult with surgery   ID consult appreciated on vanco and zosyn

## 2017-05-09 NOTE — PROGRESS NOTE ADULT - SUBJECTIVE AND OBJECTIVE BOX
Surgery NP note    Patient seen and examined bedside   No new complaints offered.   Abdominal pain, Denies nausea and vomiting. Tolerating diet.   flatus/BM.       T(F): 98.6, Max: 100.6 (05-08 @ 19:23)  HR: 98 (98 - 121)  BP: 119/58 (119/58 - 154/73)  RR: 16 (16 - 18)  SpO2: 96% (96% - 98%)  Wt(kg): --  CAPILLARY BLOOD GLUCOSE      PHYSICAL EXAM:  General: NAD.   Neuro:  Alert &Responsive  HEENT: NCAT, EOMI, conjunctiva clear  CV: +S1+S2 regular rate and rhythm  Lung: clear to ausculation bilaterally, respirations nonlabored, good inspiratory effort  Abdomen: soft,  non tender, no distention, + bowel sounds  Extremities: no pedal edema or calf tenderness noted   : No CVA or SP tenderness  Stage 4 sacral decubitus ulcer. Wound noted with exposed bone and minimal slough, no foul odor  Neuro/Psych: Not oriented   LABS:                        8.8    12.3  )-----------( 510      ( 09 May 2017 04:55 )             27.0     05-09    147<H>  |  113<H>  |  13  ----------------------------<  104<H>  3.0<L>   |  25  |  1.17    Ca    7.9<L>      09 May 2017 04:55        I&O's Detail    I & Os for current day (as of 09 May 2017 11:43)  =============================================  IN:    Oral Fluid: 50 ml    Total IN: 50 ml  ---------------------------------------------  OUT:    Indwelling Catheter - Urethral: 1000 ml    Total OUT: 1000 ml  ---------------------------------------------  Total NET: -950 ml      Assessment: 70 y/o female presenting with stage 4 sacral decubitus ulcer.       Plan:     -Discussed with PT wound care will start wound vac   -No surgical intervention or debridement needed at this time  -Continue medical management  -continue abx   -discussed with

## 2017-05-09 NOTE — PROGRESS NOTE ADULT - SUBJECTIVE AND OBJECTIVE BOX
Patient is a 71y old  Female who presents with a chief complaint of Worsening of sacral ulcer. (04 May 2017 01:26)      INTERVAL HPI / OVERNIGHT EVENTS:low grade fever yesterday    MEDICATIONS  (STANDING):  psyllium Powder 1Packet(s) Oral daily  polyethylene glycol 3350 17Gram(s) Oral daily  baclofen 20milliGRAM(s) Oral two times a day  multivitamin 1Tablet(s) Oral daily  heparin  Injectable 5000Unit(s) SubCutaneous every 12 hours  piperacillin/tazobactam IVPB. 3.375Gram(s) IV Intermittent every 8 hours  collagenase Ointment 1Application(s) Topical two times a day  dextrose 5% + sodium chloride 0.9%. 1000milliLiter(s) IV Continuous <Continuous>    MEDICATIONS  (PRN):  morphine  - Injectable 2milliGRAM(s) IV Push every 4 hours PRN Moderate Pain (4 - 6)  acetaminophen   Tablet 650milliGRAM(s) Oral every 6 hours PRN For Temp greater than 38 C (100.4 F)      Vital Signs Last 24 Hrs  T(C): 37, Max: 37.3 (05-09 @ 05:15)  T(F): 98.6, Max: 99.2 (05-09 @ 05:15)  HR: 98 (98 - 103)  BP: 119/58 (119/58 - 154/73)  BP(mean): --  RR: 16 (16 - 18)  SpO2: 96% (96% - 97%)    PHYSICAL EXAM:  General :NAD  Constitutional: contractures  Respiratory: CTAB/L,trach collar  Cardiovascular: S1 and S2, RRR, no M/G/R  Gastrointestinal: BS+, soft, NT/ND  Extremities: No peripheral edema  Vascular: 2+ peripheral pulses  Skin: sacral wound examined ,with packing,serosanguious draiange      LABS:                        8.8    12.3  )-----------( 510      ( 09 May 2017 04:55 )             27.0     05-09    147<H>  |  113<H>  |  13  ----------------------------<  104<H>  3.0<L>   |  25  |  1.17    Ca    7.9<L>      09 May 2017 04:55            MICROBIOLOGY:  RECENT CULTURES:  05-04 .Blood Blood-Peripheral XXXX XXXX   No growth at 5 days.    05-04 .Blood Blood-Peripheral XXXX XXXX   No growth at 5 days.          RADIOLOGY & ADDITIONAL STUDIES:

## 2017-05-09 NOTE — PROGRESS NOTE ADULT - SUBJECTIVE AND OBJECTIVE BOX
Patient is a 71y old  Female who presents with a chief complaint of Worsening of sacral ulcer. (04 May 2017 01:26)      OVERNIGHT EVENTS: fever overnight   MEDICATIONS  (STANDING):  psyllium Powder 1Packet(s) Oral daily  polyethylene glycol 3350 17Gram(s) Oral daily  baclofen 20milliGRAM(s) Oral two times a day  multivitamin 1Tablet(s) Oral daily  heparin  Injectable 5000Unit(s) SubCutaneous every 12 hours  piperacillin/tazobactam IVPB. 3.375Gram(s) IV Intermittent every 8 hours  collagenase Ointment 1Application(s) Topical two times a day  dextrose 5% + sodium chloride 0.9%. 1000milliLiter(s) IV Continuous <Continuous>    MEDICATIONS  (PRN):  morphine  - Injectable 2milliGRAM(s) IV Push every 4 hours PRN Moderate Pain (4 - 6)  acetaminophen   Tablet 650milliGRAM(s) Oral every 6 hours PRN For Temp greater than 38 C (100.4 F)      Vital Signs Last 24 Hrs  T(C): 37, Max: 38.1 (05-08 @ 19:23)  T(F): 98.6, Max: 100.6 (05-08 @ 19:23)  HR: 98 (98 - 121)  BP: 119/58 (119/58 - 154/73)  BP(mean): --  RR: 16 (16 - 18)  SpO2: 96% (96% - 98%)    PHYSICAL EXAM:   General :NAD  Constitutional: contractures, functional quadriplegia  Respiratory: CTAB/L,s/p trach collar  Cardiovascular: S1 and S2, RRR, no M/G/R  Gastrointestinal: BS+, soft, NT/ND  Extremities: No peripheral edema  Vascular: 2+ peripheral pulses  Skin: sacral decubiti        LABS:                        8.8    12.3  )-----------( 510      ( 09 May 2017 04:55 )             27.0     05-09    147<H>  |  113<H>  |  13  ----------------------------<  104<H>  3.0<L>   |  25  |  1.17    Ca    7.9<L>      09 May 2017 04:55         cardiac markers     CAPILLARY BLOOD GLUCOSE    Cultures    RADIOLOGY & ADDITIONAL TESTS:    Imaging Personally Reviewed:  [ x] YES  [ ] NO    Consultant(s) Notes Reviewed:  [x ] YES  [ ] NO    Care Discussed with Consultants/Other Providers [x ] YES  [ ] NO

## 2017-05-10 NOTE — PROGRESS NOTE ADULT - SUBJECTIVE AND OBJECTIVE BOX
Patient is a 71y old  Female who presents with a chief complaint of Worsening of sacral ulcer. (04 May 2017 01:26)      INTERVAL HPI / OVERNIGHT EVENTS: no fever, no new event     MEDICATIONS  (STANDING):  psyllium Powder 1Packet(s) Oral daily  polyethylene glycol 3350 17Gram(s) Oral daily  baclofen 20milliGRAM(s) Oral two times a day  multivitamin 1Tablet(s) Oral daily  heparin  Injectable 5000Unit(s) SubCutaneous every 12 hours  piperacillin/tazobactam IVPB. 3.375Gram(s) IV Intermittent every 8 hours  collagenase Ointment 1Application(s) Topical two times a day  dextrose 5% + sodium chloride 0.9%. 1000milliLiter(s) IV Continuous <Continuous>    MEDICATIONS  (PRN):  morphine  - Injectable 2milliGRAM(s) IV Push every 4 hours PRN Moderate Pain (4 - 6)  acetaminophen   Tablet 650milliGRAM(s) Oral every 6 hours PRN For Temp greater than 38 C (100.4 F)      Vital Signs Last 24 Hrs  Tmax : afebirle  HR: 98 (98 - 103)  BP: 119/58 (119/58 - 154/73)  BP(mean): --  RR: 16 (16 - 18)  SpO2: 96% (96% - 97%)    PHYSICAL EXAM:  General :NAD  Constitutional: contractures  Respiratory: CTAB/L,trach collar  Cardiovascular: S1 and S2, RRR, no M/G/R  Gastrointestinal: BS+, soft, NT/ND  Extremities: No peripheral edema  Vascular: 2+ peripheral pulses  Skin: sacral wound with wound vac placement +  LABS:             WBC 13  cr 1.33            MICROBIOLOGY:  RECENT CULTURES:  05-04 .Blood Blood-Peripheral XXXX XXXX   No growth at 5 days.    05-04 .Blood Blood-Peripheral XXXX XXXX   No growth at 5 days.          RADIOLOGY & ADDITIONAL STUDIES:

## 2017-05-10 NOTE — PROGRESS NOTE ADULT - SUBJECTIVE AND OBJECTIVE BOX
Patient is a 71y old  Female who presents with a chief complaint of Worsening of sacral ulcer. (04 May 2017 01:26)       OVERNIGHT EVENTS: wound vac placed yesterday    MEDICATIONS  (STANDING):  psyllium Powder 1Packet(s) Oral daily  polyethylene glycol 3350 17Gram(s) Oral daily  baclofen 20milliGRAM(s) Oral two times a day  multivitamin 1Tablet(s) Oral daily  heparin  Injectable 5000Unit(s) SubCutaneous every 12 hours  piperacillin/tazobactam IVPB. 3.375Gram(s) IV Intermittent every 8 hours  collagenase Ointment 1Application(s) Topical two times a day  dextrose 5% + sodium chloride 0.9%. 1000milliLiter(s) IV Continuous <Continuous>  oxyCODONE ER Tablet 10milliGRAM(s) Oral every 12 hours    MEDICATIONS  (PRN):  morphine  - Injectable 2milliGRAM(s) IV Push every 4 hours PRN Moderate Pain (4 - 6)  acetaminophen   Tablet 650milliGRAM(s) Oral every 6 hours PRN For Temp greater than 38 C (100.4 F)     Vital Signs Last 24 Hrs  T(C): 36.7, Max: 37.2 (05-09 @ 23:29)  T(F): 98, Max: 98.9 (05-09 @ 23:29)  HR: 89 (89 - 109)  BP: 93/46 (93/46 - 107/59)  BP(mean): --  RR: 19 (16 - 19)  SpO2: 98% (94% - 98%)     PHYSICAL EXAM:   General :NAD, sleeping , arousable  Constitutional: contractures, functional quadriplegia  Respiratory: CTAB/L, trach collar  Cardiovascular: S1 and S2, RRR, no M/G/R  Gastrointestinal: BS+, soft, NT/ND  Extremities: No peripheral edema  Vascular: 2+ peripheral pulses  Skin: sacral decubiti    LABS:                        8.7    13.1  )-----------( 414      ( 10 May 2017 12:41 )             27.2     05-10    150<H>  |  117<H>  |  12  ----------------------------<  102<H>  3.4<L>   |  24  |  1.30    Ca    8.1<L>      10 May 2017 12:41  Phos  2.8     05-10  Mg     2.1     05-10         cardiac markers     CAPILLARY BLOOD GLUCOSE    Cultures    RADIOLOGY & ADDITIONAL TESTS:    Imaging Personally Reviewed:  [ x] YES  [ ] NO    Consultant(s) Notes Reviewed:  [ x] YES  [ ] NO    Care Discussed with Consultants/Other Providers [x ] YES  [ ] NO ,

## 2017-05-11 NOTE — GOALS OF CARE CONVERSATION - PERSONAL ADVANCE DIRECTIVE - WHAT MATTERS MOST
Called Fabiola Biswas and will be coming in at 5;30 pm for goals of care conversation and advanced care planning. Called Fabiola Biswas and will be coming in at 5;30 pm for goals of care conversation and advanced care planning. Met with sister Fabiola Biswas and Mother Satnam Biswas and discussed goals of care and advanced care planning. Educated on [facility] and Hospice care agreed for hospice evaluation. Hospice eval called in.

## 2017-05-11 NOTE — PROGRESS NOTE ADULT - SUBJECTIVE AND OBJECTIVE BOX
Initial Consultaion Note  Requested by Name:  Date/ Time:  Reason for referral/ Consultation:    HPI:  70 y/o female nonverbal, PMH CVA (bed bound for the last 18 years), HTN, chronic constipation, and tracheostomy,  presents to the ED for worsening of her sacral ulcer.  Patient comes from home, is cared for by 4 attendants around the clock and has a visiting wound care nurse.  Per wound care nurse patient's sacral ulcer has become severe and gotten to a point where she can no longer care for it, patient was sent here for evaluation. Decubitus reported as having possible purulent drainage. No report of fever or chills, no chest pain or abdominal pain, no nausea or vomiting, no dysuria, SOB,  cough or sore throat. Pt recently admitted here on 1/17, had UTI. Pt previously reported to be HIV positive; workup at that time showed no evidence of HIV. No further history obtainable. (04 May 2017 01:26)      PAST MEDICAL & SURGICAL HISTORY:  Decubital ulcer  Tracheostomy in place  Uterine leiomyoma, unspecified location  HIV (human immunodeficiency virus infection)  Essential hypertension  Cerebrovascular accident (CVA), unspecified mechanism  HIV (human immunodeficiency virus infection)  H/O knee surgery      SOCIAL HISTORY:Admitted from: home [ ] SNF [ ] [facility] [ ] [facility] [ ]                                                                smoker [ ] past smoker [ ] non smoker[ ]                                                              no alcohol [ ] social alcohol [ ] alcohol [ ]  Surrogate/ HCP/ Guardian: [ ] YES [ ] NO. Name/ Phone#:  Significant other/partner:                     Children:                         Mormonism/Spirituality:    FAMILY HISTORY:  No pertinent family history in first degree relatives      Baseline ADLs (Prior to admission)  Independent:  Moderately [ ] fully [ ]   Dependent: moderately [ ] fully [ ]    ADVANCE DIRECTIVES:  [ ] YES [ ] NO   DNR: YES [ ] NO [  ]  Completed on:                     MOLST: YES [ ] NO [  ]  Completed on:  Living Will: YES [ ] NO [  ]  Completed on:    Allergies    No Known Allergies    Intolerances      MEDICATIONS  (STANDING):  psyllium Powder 1Packet(s) Oral daily  polyethylene glycol 3350 17Gram(s) Oral daily  baclofen 20milliGRAM(s) Oral two times a day  multivitamin 1Tablet(s) Oral daily  heparin  Injectable 5000Unit(s) SubCutaneous every 12 hours  piperacillin/tazobactam IVPB. 3.375Gram(s) IV Intermittent every 8 hours  collagenase Ointment 1Application(s) Topical two times a day  oxyCODONE ER Tablet 10milliGRAM(s) Oral every 12 hours  dextrose 5% + sodium chloride 0.45%. 1000milliLiter(s) IV Continuous <Continuous>    MEDICATIONS  (PRN):  morphine  - Injectable 2milliGRAM(s) IV Push every 4 hours PRN Moderate Pain (4 - 6)  acetaminophen   Tablet 650milliGRAM(s) Oral every 6 hours PRN For Temp greater than 38 C (100.4 F)  acetaminophen  Suppository 650milliGRAM(s) Rectal every 6 hours PRN For Temp greater than 38 C (100.4 F)    OPIATE NAIVE: (Y/N)  I STOP REVIEWED: (Y/N) : (#-------------------)   Review of Systems:     PAIN : (Y/N) (0-10)  PAIN SITE :                           QUALITY/QUANTITY OF PAIN :             PAIN RADIATING :( Y/N) SEVERITY :            FREQUENCY :  IMPACT ON ADLs :      DYSPNEA: Yes [  ] No [  ] Mild [ ] Moderate [ ] Severe [ ]  NAUSEA/VOMITING: Yes [  ] No [  ]  EXCESSIVE SECRETIONS: YES [  ] NO [  ]  DEPRESSION: Yes [  ] No [  ] Mild [ ]Moderate [ ] Severe [ ]  ANXIETY: Yes [  ] No [  ]  AGITATION: Yes [  ] No [  ]  CONSTIPATION : Yes [  ] No [  ]  DIARRHEA : Yes [  ] No [  ]  FRAILTY SYNDROME: Yes [  ] No [  ]  FAILURE TO THRIVE: Yes [  ] No [  ]  DIBILITY: Yes [  ] No [  ]  OTHER SYMPTOMS :  UNABLE TO OBTAIN DUE TO POOR MENTATION [  ]    PHYSICAL EXAM:  T(F): 98.8, Max: 101.9 (05-11 @ 00:10)  HR: 122 (110 - 147)  BP: 132/66 (121/62 - 132/66)  RR: 18 (16 - 18)  SpO2: 97% (96% - 98%)  Wt(kg): --   WEIGHT :                      BMI:  I&O's Summary    I & Os for current day (as of 11 May 2017 15:24)  =============================================  IN: 4680 ml / OUT: 1200 ml / NET: 3480 ml    CAPILLARY BLOOD GLUCOSE  134 (11 May 2017 00:11)      GENERAL: alert: Yes [ ] No [  ]oriented x ______ lethargic [ ] agitated [ ] cachexia [ ] nonverbal [ ] coma [ ]  HEENT: normal [ ] dry mouth [ ] Bipap [  ] ET tube/trach [ ]Vent [  ] excessive secretions [ ]  LUNGS: Comfortable [ ] tachypnea/ labored breathing[ ]   CV :  normal [ ] tachycardia [ ]bradycardia [  ]   GI : normal [ ] distended [ ] tender [ ] no BS [ ]  PEG /NG tube [ ]   : normal [ ] incontinent [ ] oliguria/anuria [ ] bazzi [ ]  MSK : normal [ ] weakness [ ] edema [ ] ambulatory [ ] bebbound/wheelchair bound [ ]   SKIN : normal [ ] pressure ulcer: Yes [  ] No [  ] Stage ______________ rash: Yes [  ] No [  ]    Functional Assessment:   Karnofsky Performance Score :  Palliative Performance Status Version 2:         %    LABS:                        8.5    24.9  )-----------( 359      ( 11 May 2017 08:19 )             25.8     05-11    149<H>  |  119<H>  |  11  ----------------------------<  131<H>  3.4<L>   |  20<L>  |  1.20    Ca    7.6<L>      11 May 2017 08:19  Phos  2.8     05-10  Mg     2.1     05-10    TPro  5.8<L>  /  Alb  1.1<L>  /  TBili  0.5  /  DBili  x   /  AST  16  /  ALT  10<L>  /  AlkPhos  135<H>  05-11          I&O's Detail    I & Os for current day (as of 11 May 2017 15:24)  =============================================  IN:    Sodium Chloride 0.9% IV Bolus: 2000 ml    dextrose 5% + sodium chloride 0.45%.: 1280 ml    dextrose 5% + sodium chloride 0.9%: 800 ml    Solution: 300 ml    Oral Fluid: 100 ml    Solution: 100 ml    Solution: 100 ml    Total IN: 4680 ml  ---------------------------------------------  OUT:    Indwelling Catheter - Urethral: 1200 ml    Total OUT: 1200 ml  ---------------------------------------------  Total NET: 3480 ml      Assessment:   71y Female admitted with UNSTAGEABLE DECUBITUS ULCER OF BUTTOCK  BED SORE      PROBLEM LIST :  PROBLEM/RECOMMENDATION: 1) Problem : Goals of care, counseling/ discussion.  Recommendation: Meet with patient/ family and discussed GOC & Advanced care planning                                    Palliative care information /counseling                                        Advanced Directives addressed   PROBLEM/ RECOMMENDATION:2)Problem :Resuscitation/ DNR/ DNI,   Recommendation: DNR/ DNI    PROBLEM/ RECOMMENDATION :3)Problem : Medical order for life sustaning treatment  Recommendation : MOLST Form ( initiated/ completed)    PROBLEM/RECOMMENDATION :4)Problem : Advanced care planning  Recommendation : Family meeting on:     PLAN:  REFFERALS:  Hospice: YES [  ] NO [  ]                         Palliative Med : YES [  ] NO [  ]                         Unit SW/Case Mgmt: YES [  ] NO [  ]                         : YES [  ] NO [  ]                         Speech/Swallow: YES [  ] NO [  ]                         Pain Management: YES [  ] NO [  ]                         Nutrition: YES [  ] NO [  ]                         Ethics: YES [  ] NO [  ]                         PT/OT: YES [  ] NO [  ]  Symptom Management Recomendations: Initial Consultaion Note  Requested by Name: Dr. Alanna Franco  Date/ Time: 5/11/17  Reason for referral/ Consultation: Goals of care conversation    HPI:  72 y/o female nonverbal, PMH CVA (bed bound for the last 18 years), HTN, chronic constipation, and tracheostomy,  presents to the ED for worsening of her sacral ulcer.  Patient comes from home, is cared for by 4 attendants around the clock and has a visiting wound care nurse.  Per wound care nurse patient's sacral ulcer has become severe and gotten to a point where she can no longer care for it, patient was sent here for evaluation. Decubitus reported as having possible purulent drainage. No report of fever or chills, no chest pain or abdominal pain, no nausea or vomiting, no dysuria, SOB,  cough or sore throat. Pt recently admitted here on 1/17, had UTI. Pt previously reported to be HIV positive; workup at that time showed no evidence of HIV. No further history obtainable.    PAST MEDICAL & SURGICAL HISTORY:  Decubital ulcer  Tracheostomy in place  Uterine leiomyoma, unspecified location  HIV (human immunodeficiency virus infection)  Essential hypertension  Cerebrovascular accident (CVA), unspecified mechanism  HIV (human immunodeficiency virus infection)  H/O knee surgery    SOCIAL HISTORY: Admitted from: home [X ] SNF [ ] [facility] [ ] [facility] [ ]                                                                smoker [ ] past smoker [ ] non smoker[ ]                                                              no alcohol [ ] social alcohol [ ] alcohol [ ]  Surrogate/ HCP/ Guardian: [ X] YES [ ] NO. Name/ Phone#: Fabiola Biswas / [phone]  Significant other/partner:                     Children:                         Jainism/Spirituality:    FAMILY HISTORY:  No pertinent family history in first degree relatives    Baseline ADLs (Prior to admission)  Independent:  Moderately [ ] fully [ ]   Dependent: moderately [ ] fully [ x]    ADVANCE DIRECTIVES:  [ ] YES [x ] NO   DNR: YES [ ] NO [ x ]  Completed on:                     MOLST: YES [ ] NO [ x ]  Completed on:  Living Will: YES [ ] NO [ x ]  Completed on:    Allergies    No Known Allergies    Intolerances      MEDICATIONS  (STANDING):  psyllium Powder 1Packet(s) Oral daily  polyethylene glycol 3350 17Gram(s) Oral daily  baclofen 20milliGRAM(s) Oral two times a day  multivitamin 1Tablet(s) Oral daily  heparin  Injectable 5000Unit(s) SubCutaneous every 12 hours  piperacillin/tazobactam IVPB. 3.375Gram(s) IV Intermittent every 8 hours  collagenase Ointment 1Application(s) Topical two times a day  oxyCODONE ER Tablet 10milliGRAM(s) Oral every 12 hours  dextrose 5% + sodium chloride 0.45%. 1000milliLiter(s) IV Continuous <Continuous>    MEDICATIONS  (PRN):  morphine  - Injectable 2milliGRAM(s) IV Push every 4 hours PRN Moderate Pain (4 - 6)  acetaminophen   Tablet 650milliGRAM(s) Oral every 6 hours PRN For Temp greater than 38 C (100.4 F)  acetaminophen  Suppository 650milliGRAM(s) Rectal every 6 hours PRN For Temp greater than 38 C (100.4 F)    OPIATE NAIVE: (Y/N)  I STOP REVIEWED: (Y/N) : (#-------------------)   Review of Systems:     PAIN : (Y/N) (0-10)  PAIN SITE :  generalized            QUALITY/QUANTITY OF PAIN : Defussed         PAIN RADIATING : Y  SEVERITY :            FREQUENCY : CONTINUES   IMPACT ON ADLs :   TOTAL CARE   DYSPNEA: Yes [ X ] No [  ] Mild [ ] Moderate [ ] Severe [X ]  NAUSEA/VOMITING: Yes [  ] No [X  ]  EXCESSIVE SECRETIONS: YES [X  ] NO [  ]  DEPRESSION: Yes [X  ] No [  ] Mild [ ]Moderate [ ] Severe [ ]  ANXIETY: Yes [ X ] No [  ]  AGITATION: Yes [  ] No [ X ]  CONSTIPATION : Yes [  ] No [X  ]  DIARRHEA : Yes [  ] No [X  ]  FRAILTY SYNDROME: Yes [ X ] No [  ]  FAILURE TO THRIVE: Yes [ X ] No [  ]  DEBILITY Yes [X  ] No [  ]  OTHER SYMPTOMS :  UNABLE TO OBTAIN DUE TO POOR MENTATION [ X ]    PHYSICAL EXAM:  T(F): 98.8, Max: 101.9 (05-11 @ 00:10)  HR: 122 (110 - 147)  BP: 132/66 (121/62 - 132/66)  RR: 18 (16 - 18)  SpO2: 97% (96% - 98%)  Wt(kg): --   WEIGHT :                      BMI:  I&O's Summary    I & Os for current day (as of 11 May 2017 15:24)  =============================================  IN: 4680 ml / OUT: 1200 ml / NET: 3480 ml    CAPILLARY BLOOD GLUCOSE  134 (11 May 2017 00:11)      GENERAL: alert: Yes [ ] No [ X ]oriented x ______ lethargic [ ] agitated [ ] cachexia [X ] nonverbal [X ] coma [ ]  HEENT: normal [ ] dry mouth [ ] Bipap [  ] ET tube/trach [ X]Vent [  ] excessive secretions [ ]  LUNGS: Comfortable [ ] tachypnea/ labored breathing[X ]   CV :  normal [ ] tachycardia [X ]bradycardia [  ]   GI : normal [ X] distended [ ] tender [ ] no BS [ ]  PEG /NG tube [ ]   : normal [ ] incontinent [ ] oliguria/anuria [ ] bazzi [X ]  MSK : normal [ ] weakness [ ] edema [ ] ambulatory [ ] bed bound/ wheelchair bound [x ]   SKIN : normal [ ] pressure ulcer: Yes [ x ] No [  ] Stage UN STAGEABLE WITH wound vac on rash: Yes [  ] No [  ]    Functional Assessment:   Karnofsky Performance Score : <20  Palliative Performance Status Version 2:         %    LABS:                        8.5    24.9  )-----------( 359      ( 11 May 2017 08:19 )             25.8     05-11    149<H>  |  119<H>  |  11  ----------------------------<  131<H>  3.4<L>   |  20<L>  |  1.20    Ca    7.6<L>      11 May 2017 08:19  Phos  2.8     05-10  Mg     2.1     05-10    TPro  5.8<L>  /  Alb  1.1<L>  /  TBili  0.5  /  DBili  x   /  AST  16  /  ALT  10<L>  /  AlkPhos  135<H>  05-11      I&O's Detail    I & Os for current day (as of 11 May 2017 15:24)  =============================================  IN:    Sodium Chloride 0.9% IV Bolus: 2000 ml    dextrose 5% + sodium chloride 0.45%.: 1280 ml    dextrose 5% + sodium chloride 0.9%: 800 ml    Solution: 300 ml    Oral Fluid: 100 ml    Solution: 100 ml    Solution: 100 ml    Total IN: 4680 ml  ---------------------------------------------  OUT:    Indwelling Catheter - Urethral: 1200 ml    Total OUT: 1200 ml  ---------------------------------------------  Total NET: 3480 ml    Assessment:   71y Female admitted with UN STAGEABLE DECUBITUS ULCER OF BUTTOCK  BED SORE    PROBLEM LIST :  PROBLEM/RECOMMENDATION: 1) Problem : Goals of care, counseling/ discussion.  Recommendation: Meet with patient/ family and discussed GOC & Advanced care planning                                    Palliative care information /counseling                                        Advanced Directives addressed   PROBLEM/ RECOMMENDATION:2)Problem :Resuscitation/ DNR/ DNI,   Recommendation: DNR/ DNI    PROBLEM/ RECOMMENDATION :3)Problem : Medical order for life sustaning treatment  Recommendation : MOLST Form ( initiated/ completed)    PROBLEM/RECOMMENDATION :4)Problem : Advanced care planning  Recommendation : Family meeting on: Called sister Fabiola Biswas and spoke over the phone. stated that will be coming in this evening for goals of care conversation    PLAN:  REFFERALS:  Hospice: YES [  ] NO [  ]                         Palliative Med : YES [  ] NO [  ]                         Unit SW/Case Mgmt: YES [  ] NO [  ]                         : YES [  ] NO [  ]                         Speech/Swallow: YES [  ] NO [  ]                         Pain Management: YES [  ] NO [  ]                         Nutrition: YES [  ] NO [  ]                         Ethics: YES [  ] NO [  ]                         PT/OT: YES [  ] NO [  ]  Symptom Management Recommendations: Palliative care,   Complete MOLST, DNR/DNI.  HOSPICE CARE.

## 2017-05-11 NOTE — CHART NOTE - NSCHARTNOTEFT_GEN_A_CORE
RRT note    RRT called by RN for tachycardia in 130s. RN states that tachycardia began after turning the patient. Patient in severe pain from sacral decubitus ulcer stage IV and moaning in pain, unable to speak at this time due to pain.     Vital Signs   Temp: 101.9 rectal  HR: 147  BP: 135/55  RR: 16  SpO2: 96  Blood sugar: 134    EKG: sinus tachycardia 141 bpm    PHYSICAL EXAM:  GEN: moaning in pain, responds to pain, nonverbal  NECK: trache clean/dry/intact   CARDIAC: sinus tachycardia, S1 S2  LUNGS: CTA b/l  ABDOMEN: nondistended, soft, +BS, nontender  BACK: sacral decub ulcer with vac with good seal and functioning   : bazzi with clear yellow urine  EXTREMITIES: arms contracted, no calf edema/tenderness b/l      Assessment: 71 yr old female  with history of CVA ,s/p trach bedridden seen with worsening sacral decubitus. now with RRT for tachycardia in 130s-140s. Labs show elevated lactate 2.8 and elevated WBC: 17.7. CXR shows possible aspiration PNA.     Plan:  -STAT: cbc with diff, bmp, blood cultures x 2 sets, lactate: elevated lactate 2.8 and WBC 17.7  -STAT 2 boluses normal saline 1L   -Tylenol suppository for fever  -morphine for pain  -STAT EKG: sinus tachycardia  -Urgent CXR: showing possible aspiration PNA  -1 dose of cefepine  -continue zosyn and current medical management

## 2017-05-11 NOTE — GOALS OF CARE CONVERSATION - PERSONAL ADVANCE DIRECTIVE - CONVERSATION DETAILS
70 y/o female nonverbal, PMH CVA (bed bound for the last 18 years), HTN, chronic constipation, and tracheostomy,  presents to the ED for worsening of her sacral ulcer.  Patient comes from home, is cared for by 4 attendants around the clock and has a visiting wound care nurse.  Per wound care nurse patient's sacral ulcer has become severe and gotten to a point where she can no longer care for it, patient was sent here for evaluation. Called Fabiola Biswas to discus goals of care and advanced care

## 2017-05-11 NOTE — PROGRESS NOTE ADULT - PROBLEM SELECTOR PLAN 3
pt has mutlifocal pna in the repeat cxr   possible health care associated gram neg pna  change zosyn to meropenem   check PCT in am

## 2017-05-11 NOTE — PROGRESS NOTE ADULT - SUBJECTIVE AND OBJECTIVE BOX
Patient is a 71y old  Female who presents with a chief complaint of Worsening of sacral ulcer. (04 May 2017 01:26)      OVERNIGHT EVENTS: moans all the time.     REVIEW OF SYSTEMS: unable to provide due to non verbal     MEDICATIONS  (STANDING):  psyllium Powder 1Packet(s) Oral daily  polyethylene glycol 3350 17Gram(s) Oral daily  baclofen 20milliGRAM(s) Oral two times a day  multivitamin 1Tablet(s) Oral daily  heparin  Injectable 5000Unit(s) SubCutaneous every 12 hours  collagenase Ointment 1Application(s) Topical two times a day  oxyCODONE ER Tablet 10milliGRAM(s) Oral every 12 hours  dextrose 5% + sodium chloride 0.45%. 1000milliLiter(s) IV Continuous <Continuous>  meropenem IVPB  IV Intermittent   meropenem IVPB 500milliGRAM(s) IV Intermittent once    MEDICATIONS  (PRN):  morphine  - Injectable 2milliGRAM(s) IV Push every 4 hours PRN Moderate Pain (4 - 6)  acetaminophen   Tablet 650milliGRAM(s) Oral every 6 hours PRN For Temp greater than 38 C (100.4 F)  acetaminophen  Suppository 650milliGRAM(s) Rectal every 6 hours PRN For Temp greater than 38 C (100.4 F)      Allergies    No Known Allergies    Intolerances        T(F): 98.8, Max: 101.9 (05-11 @ 00:10)  HR: 122 (110 - 147)  BP: 132/66 (121/62 - 132/66)  RR: 18 (16 - 18)  SpO2: 97% (96% - 98%)  Wt(kg): --    PHYSICAL EXAM:  GENERAL: chronically ill looking, moaning   HEAD:  Atraumatic, Normocephalic  EYES: PERRLA, conjunctiva and sclera clear  ENMT: No tonsillar erythema, exudates, or enlargement; Moist mucous membranes  NECK: Supple, No JVD, Normal thyroid  NERVOUS SYSTEM:  awake, confused, does not follows commands, contracted, functional quadriplegia   CHEST/LUNG: Clear to percussion bilaterally; No rales, rhonchi, wheezing, or rubs BL  HEART: Regular rate and rhythm; No murmurs, rubs, or gallops  ABDOMEN: Soft, Nontender, Nondistended; Bowel sounds present  EXTREMITIES:  2+ Peripheral Pulses, No clubbing, cyanosis, or edema BL LE  LYMPH: No lymphadenopathy noted  SKIN: No rashes or lesions    LABS:                        8.5    24.9  )-----------( 359      ( 11 May 2017 08:19 )             25.8     05-11    149<H>  |  119<H>  |  11  ----------------------------<  131<H>  3.4<L>   |  20<L>  |  1.20    Ca    7.6<L>      11 May 2017 08:19  Phos  2.8     05-10  Mg     2.1     05-10    TPro  5.8<L>  /  Alb  1.1<L>  /  TBili  0.5  /  DBili  x   /  AST  16  /  ALT  10<L>  /  AlkPhos  135<H>  05-11        Cultures;   CAPILLARY BLOOD GLUCOSE  134 (11 May 2017 00:11)      RADIOLOGY & ADDITIONAL TESTS:    Imaging Personally Reviewed:  [ x] YES      Consultant(s) Notes Reviewed:  [x ] YES     Care Discussed with [ x] Consultants [ ] Patient [ ] Family  [x ]    [x ]  Other; RN Patient is a 71y old  Female who presents with a chief complaint of Worsening of sacral ulcer. (04 May 2017 01:26)    OVERNIGHT EVENTS: moans all the time.     REVIEW OF SYSTEMS: unable to provide due to non verbal     MEDICATIONS  (STANDING):  psyllium Powder 1Packet(s) Oral daily  polyethylene glycol 3350 17Gram(s) Oral daily  baclofen 20milliGRAM(s) Oral two times a day  multivitamin 1Tablet(s) Oral daily  heparin  Injectable 5000Unit(s) SubCutaneous every 12 hours  collagenase Ointment 1Application(s) Topical two times a day  oxyCODONE ER Tablet 10milliGRAM(s) Oral every 12 hours  dextrose 5% + sodium chloride 0.45%. 1000milliLiter(s) IV Continuous <Continuous>  meropenem IVPB  IV Intermittent   meropenem IVPB 500milliGRAM(s) IV Intermittent once    MEDICATIONS  (PRN):  morphine  - Injectable 2milliGRAM(s) IV Push every 4 hours PRN Moderate Pain (4 - 6)  acetaminophen   Tablet 650milliGRAM(s) Oral every 6 hours PRN For Temp greater than 38 C (100.4 F)  acetaminophen  Suppository 650milliGRAM(s) Rectal every 6 hours PRN For Temp greater than 38 C (100.4 F)      Allergies    No Known Allergies    Intolerances        T(F): 98.8, Max: 101.9 (05-11 @ 00:10)  HR: 122 (110 - 147)  BP: 132/66 (121/62 - 132/66)  RR: 18 (16 - 18)  SpO2: 97% (96% - 98%)  Wt(kg): --    PHYSICAL EXAM:  GENERAL: chronically ill looking, moaning   HEAD:  Atraumatic, Normocephalic  EYES: PERRLA, conjunctiva and sclera clear  ENMT: No tonsillar erythema, exudates, or enlargement; Moist mucous membranes  NECK: Supple, No JVD, Normal thyroid  NERVOUS SYSTEM:  awake, confused, does not follows commands, contracted, functional quadriplegia   CHEST/LUNG: Clear to percussion bilaterally; No rales, rhonchi, wheezing, or rubs BL  HEART: Regular rate and rhythm; No murmurs, rubs, or gallops  ABDOMEN: Soft, Nontender, Nondistended; Bowel sounds present  EXTREMITIES:  2+ Peripheral Pulses, No clubbing, cyanosis, or edema BL LE  LYMPH: No lymphadenopathy noted  SKIN: No rashes or lesions    LABS:                        8.5    24.9  )-----------( 359      ( 11 May 2017 08:19 )             25.8     05-11    149<H>  |  119<H>  |  11  ----------------------------<  131<H>  3.4<L>   |  20<L>  |  1.20    Ca    7.6<L>      11 May 2017 08:19  Phos  2.8     05-10  Mg     2.1     05-10    TPro  5.8<L>  /  Alb  1.1<L>  /  TBili  0.5  /  DBili  x   /  AST  16  /  ALT  10<L>  /  AlkPhos  135<H>  05-11        Cultures;   CAPILLARY BLOOD GLUCOSE  134 (11 May 2017 00:11)      RADIOLOGY & ADDITIONAL TESTS:    Imaging Personally Reviewed:  [ x] YES      Consultant(s) Notes Reviewed:  [x ] YES     Care Discussed with [ x] Consultants [ ] Patient [ ] Family  [x ]    [x ]  Other; RN

## 2017-05-11 NOTE — GOALS OF CARE CONVERSATION - PERSONAL ADVANCE DIRECTIVE - TREATMENT GUIDELINE COMMENT
Full code for now.  Pending family meeting today 5;30 PM. Full code for now.  Hospice evaluation.  Code status discussion still in progress.

## 2017-05-11 NOTE — PROGRESS NOTE ADULT - SUBJECTIVE AND OBJECTIVE BOX
Patient is a 71y old  Female who presents with a chief complaint of Worsening of sacral ulcer. (04 May 2017 01:26)      INTERVAL HPI / OVERNIGHT EVENTS: no fever, no new event     MEDICATIONS  (STANDING):  psyllium Powder 1Packet(s) Oral daily  polyethylene glycol 3350 17Gram(s) Oral daily  baclofen 20milliGRAM(s) Oral two times a day  multivitamin 1Tablet(s) Oral daily  heparin  Injectable 5000Unit(s) SubCutaneous every 12 hours  piperacillin/tazobactam IVPB. 3.375Gram(s) IV Intermittent every 8 hours  collagenase Ointment 1Application(s) Topical two times a day  dextrose 5% + sodium chloride 0.9%. 1000milliLiter(s) IV Continuous <Continuous>    MEDICATIONS  (PRN):  morphine  - Injectable 2milliGRAM(s) IV Push every 4 hours PRN Moderate Pain (4 - 6)  acetaminophen   Tablet 650milliGRAM(s) Oral every 6 hours PRN For Temp greater than 38 C (100.4 F)      Vital Signs Last 24 Hrs  Tmax: afebrile  HR: 98 (98 - 103)  BP: 119/58 (119/58 - 154/73)  BP(mean): --  RR: 16 (16 - 18)  SpO2: 96% (96% - 97%)    PHYSICAL EXAM:  General :NAD  Constitutional: contractures  Respiratory: CTAB/L, trach collar  Cardiovascular: S1 and S2, RRR, no M/G/R  Gastrointestinal: BS+, soft, NT/ND  Extremities: No peripheral edema  Vascular: 2+ peripheral pulses  Skin: sacral wound with wound vac placement +      LABS:             WBC 13--> 24.9  lactate 2.8--> 3.2  cr 1.33--> 1.2            MICROBIOLOGY:  RECENT CULTURES:  05-04 .Blood Blood-Peripheral XXXX XXXX   No growth at 5 days.    05-04 .Blood Blood-Peripheral XXXX XXXX   No growth at 5 days.          RADIOLOGY & ADDITIONAL STUDIES:

## 2017-05-11 NOTE — PROGRESS NOTE ADULT - PROBLEM SELECTOR PLAN 3
- Vanc level>35, vancomycin is on hold, will rpt vanco trough in am  - zosyn IV changed to Meropenem per ID  - wound  vac dressing.   - left msg for  regarding possible debridement today and to remove the wound vac  - f/u surgery and ID  - monitor vitals and pulse oxymetry - Vanc level>35, vancomycin is on hold, will rpt vanco trough in am  - zosyn IV changed to Meropenem per ID  - wound  vac dressing.   - left msg for  regarding possible debridement today and to remove the wound vac  - f/u surgery and ID  - monitor vitals and pulse oxymetry  - wound care with Collagenase   - increase Oxycontin to 10 mg q8 hrs, cont on Tylenol and morphine

## 2017-05-12 NOTE — PROGRESS NOTE ADULT - PROBLEM SELECTOR PROBLEM 4
Tracheostomy in place
BMI less than 19,adult
Essential hypertension
Sacral decubitus ulcer, stage IV
Sepsis, due to unspecified organism
Tracheostomy in place
Sacral decubitus ulcer, stage IV
BMI less than 19,adult

## 2017-05-12 NOTE — PROGRESS NOTE ADULT - PROBLEM SELECTOR PLAN 1
appreciated  nutrition consult and protein calorie supplementation done
- Vanc level>35, holding vancomycin  - c/w zosyn IV   -   wound  vac dressing.   -  spoke to  regarding possible debridement who agreed to reevaluate while changing the wound vac  - f/u surgery and ID
- Vanc level>35, vancomycin is on hold, will rpt vanco trough in am  - zosyn IV changed to Meropenem per ID  - wound  vac dressing.   - left msg for  regarding possible debridement today and to remove the wound vac  - f/u surgery and ID  - monitor vitals and pulse oxymetry
appreciated  nutrition consult and protein calorie supplementation done
continue zosyn  Hold Vanco as level high  check vanco level   now with wound vac(since yesterday)  Discussion with Dr Heath was made regarding requirement for debridement-he plans to see it Monday  Wound vac sh be removed pt septic ? worsening infection underneath
continue zosyn  Hold Vanco as level high  check vanco level in am   wound care-PT wound care evaluating patient no current  need for surgical debridement  will continue local wound care and plan for placement with possible vac dressing
continue zosyn  Hold Vanco as level high  check vanco level on thursday am   now with wound vac(since yesterday)  Discussion with Dr Vega was made regarding requirement for debridement
continue zosyn  Hold Vanco as level high  check vanco level on thursday am   wound care-PT wound care evaluating patient no current  need for surgical debridement  will continue local wound care and plan for placement with possible vac dressing
will need nutrition consult and protein calorie supplementation
will need nutrition consult and protein calorie supplementation
on IV Abx  ID following
continue vanco and zosyn  wound care  f/u on vanco level
- Vanc level>35, hold vancomycin  - c/w zosyn IV   - As per PT /wound care- plan for wound  vac dressing.   -  No surgical intervention or debridement needed at this time as per surgical team   - c/w wound care

## 2017-05-12 NOTE — PROGRESS NOTE ADULT - PROBLEM SELECTOR PLAN 4
- Vanc level>35, vancomycin is on hold, will rpt vanco trough in am  - zosyn IV changed to Meropenem per ID  - wound  vac dressing.   - left msg for  regarding possible debridement today and to remove the wound vac  - f/u surgery and ID  - monitor vitals and pulse oxymetry
- encourage PO diet and supplements
BP not elevated at present, follow vital signs
sec to decub infection worsening  vs pna
- zosyn IV changed to Meropenem per ID  - wound  vac dressing.   - f/u with surgery for ?possible debridement  -pain control
- encourage PO diet and supplements

## 2017-05-12 NOTE — PROGRESS NOTE ADULT - PROBLEM SELECTOR PLAN 2
- Vanc level>35, vancomycin is on hold, will rpt vanco trough in am  - zosyn IV changed to Meropenem per ID  - f/u ID  - monitor vitals and pulse oxymetry
-Urine culture < 10.0000 normal eirc
as above
IV meropenem, ID following
as above
-Urine culture < 10.0000 normal eric

## 2017-05-12 NOTE — DISCHARGE NOTE FOR THE EXPIRED PATIENT - SECONDARY DIAGNOSIS.
Cachexia Abnormal urinalysis BMI less than 19,adult Constipation, unspecified constipation type Dysphagia, pharyngeal Essential hypertension Infected decubitus ulcer, stage IV

## 2017-05-12 NOTE — PROGRESS NOTE ADULT - SUBJECTIVE AND OBJECTIVE BOX
CHIEF COMPLAINT/INTERVAL HISTORY:    Patient is a 71y old  Female who presents with a chief complaint of Worsening of sacral ulcer. 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  Patient comes from home, is cared for by 4 attendants around the clock and has a visiting wound care nurse.  Per wound care nurse patient's sacral ulcer has become severe and gotten to a point where she can no longer care for it, patient was sent here for evaluation. Decubitus reported as having possible purulent drainage. No report of fever or chills, no chest pain or abdominal pain, no nausea or vomiting, no dysuria, SOB,  cough or sore throat. Pt recently admitted here on 1/17, had UTI. Pt previously reported to be HIV positive; workup at that time showed no evidence of HIV. No further history obtainable. (04 May 2017 01:26)      SUBJECTIVE & OBJECTIVE: Pt seen and examined at bedside.   lethargic/drowsy/moaning in pain. Ill looking    Vital Signs Last 24 Hrs  T(C): 37.2, Max: 37.2 (05-11 @ 17:06)  T(F): 98.9, Max: 99 (05-11 @ 17:06)  HR: 138 (123 - 138)  BP: 129/69 (114/67 - 129/69)  BP(mean): --  ABP: --  ABP(mean): --  RR: 17 (17 - 18)  SpO2: 92% (92% - 99%)        MEDICATIONS  (STANDING):  psyllium Powder 1Packet(s) Oral daily  polyethylene glycol 3350 17Gram(s) Oral daily  baclofen 20milliGRAM(s) Oral two times a day  multivitamin 1Tablet(s) Oral daily  heparin  Injectable 5000Unit(s) SubCutaneous every 12 hours  collagenase Ointment 1Application(s) Topical two times a day  dextrose 5% + sodium chloride 0.45%. 1000milliLiter(s) IV Continuous <Continuous>  oxyCODONE ER Tablet 10milliGRAM(s) Oral every 8 hours  meropenem IVPB  IV Intermittent   meropenem IVPB 500milliGRAM(s) IV Intermittent every 12 hours  potassium chloride  10 mEq/100 mL IVPB 10milliEquivalent(s) IV Intermittent every 1 hour  ALBUTerol/ipratropium for Nebulization 3milliLiter(s) Nebulizer every 6 hours    MEDICATIONS  (PRN):  morphine  - Injectable 2milliGRAM(s) IV Push every 4 hours PRN Moderate Pain (4 - 6)  acetaminophen   Tablet 650milliGRAM(s) Oral every 6 hours PRN For Temp greater than 38 C (100.4 F)  acetaminophen  Suppository 650milliGRAM(s) Rectal every 6 hours PRN For Temp greater than 38 C (100.4 F)  acetaminophen  Suppository. 650milliGRAM(s) Rectal every 6 hours PRN Moderate Pain (4 - 6)        PHYSICAL EXAM:    GENERAL: Acute resp. distress, Moaning, drowsy/moaning and screaming, severely malnourished.  HEAD:  Atraumatic, Normocephalic  EYES: ++pallor  ENMT: dry mucous membranes  NECK: Supple, No JVD  NERVOUS SYSTEM: lethargic/drowsy/non verbal  CHEST/LUNG: b/l rhonchi + wheezing  HEART: tachycardia  ABDOMEN: Soft, Nontender, Nondistended; Bowel sounds present  EXTREMITIES:  1+ b/l pedal edemA  SKIN: sacral decubitus ulcer    LABS:                        8.4    20.3  )-----------( 323      ( 12 May 2017 08:17 )             24.8     05-12    149<H>  |  117<H>  |  13  ----------------------------<  124<H>  3.1<L>   |  21<L>  |  1.18    Ca    7.9<L>      12 May 2017 08:17  Phos  2.8     05-10  Mg     2.1     05-10    TPro  5.8<L>  /  Alb  1.1<L>  /  TBili  0.5  /  DBili  x   /  AST  16  /  ALT  10<L>  /  AlkPhos  135<H>  05-11

## 2017-05-12 NOTE — DISCHARGE NOTE FOR THE EXPIRED PATIENT - HOSPITAL COURSE
Pt seen and evaluated no heart rate, no respiratory rate, no pupillary responses, no v/s.   Pt pronounced at 11:12, Dr. Brody notified. Pt seen and evaluated no heart rate, no respiratory rate, no pupillary responses, no v/s.   Pt pronounced at 11:12, Dr. Brody notified.   Family sister Fabiola and mom Ms. Zulay Hay notified.

## 2017-05-12 NOTE — PROGRESS NOTE ADULT - PROBLEM SELECTOR PROBLEM 2
Wound infection
Abnormal urinalysis
Cachexia
Gram-negative pneumonia
Wound infection
Gram-negative pneumonia
Abnormal urinalysis

## 2017-05-12 NOTE — PROGRESS NOTE ADULT - ASSESSMENT
70 y/o bed bound woman with contracture after CVA, appears chronically malnourished, has severe decubitus ulcer with possibly purulent discharge,  PT wound care evaluating patient may need surgical debriedment
70 y/o bed bound woman with contracture after CVA, appears chronically malnourished, has severe decubitus ulcer with possibly purulent discharge,  PT wound care evaluating patient may need surgical debriedment declined will continue local wound care and plan for placement
70 y/o bed bound woman with contracture after CVA, appears chronically malnourished, has severe decubitus ulcer with possibly purulent discharge,  PT wound care evaluating patient no current  need for surgical debridement  will continue local wound care and plan for placement with possible vac dressing
70 y/o female nonverbal, PMH CVA (bed bound for the last 18 years), HTN, chronic constipation, and tracheostomy,  presents to the ED for worsening of her sacral ulcer.  Patient comes from home, is cared for by 4 attendants around the clock and has a visiting wound care nurse.  Per wound care nurse patient's sacral ulcer has become severe and gotten to a point where she can no longer care for it, patient was sent here for evaluation. Decubitus reported as having possible purulent drainage. No report of fever or chills, no chest pain or abdominal pain, no nausea or vomiting, no dysuria, SOB,  cough or sore throat. Pt recently admitted here on 1/17,   had UTI. Pt previously reported to be HIV positive; workup at that time showed no evidence of HIV. No further history obtainable.   Family meeting on: Called sister Fabiola Biswas and spoke over the phone. stated that will be coming in this evening for goals of care conversation    Symptom Management Recommendations: Palliative care,   Complete MOLST, DNR/DNI.  HOSPICE CARE.
71 yr old female  with history of CVA ,s/p trach bed ridden seen with
71 yr old female  with history of CVA ,s/p trach bedridden seen with worsening sacral decubitus
72 y/o bed bound woman with contracture after CVA, appears chronically malnourished, has severe decubitus ulcer with possibly purulent discharge,  PT wound care evaluating patient may need surgical debriedment declined will continue local wound care and plan for placement
71 yr old female  with history of CVA ,s/p trach bedridden seen with worsening sacral decubitus and also being Rx for garm neg pneumonia likely aspiration pna.

## 2017-05-12 NOTE — PROGRESS NOTE ADULT - PROBLEM SELECTOR PROBLEM 1
Decubitus ulcer of buttock, stage 3, unspecified laterality
Moderate protein-calorie malnutrition
Sacral decubitus ulcer, stage IV
Sepsis, due to unspecified organism
Sepsis, due to unspecified organism
Sacral decubitus ulcer, stage IV

## 2017-05-12 NOTE — PROGRESS NOTE ADULT - PROBLEM SELECTOR PROBLEM 3
Abnormal urinalysis
Decubitus ulcer of buttock, unstageable, unspecified laterality
Gram-negative pneumonia
Infected decubitus ulcer, stage IV
Severe protein-calorie malnutrition
Infected decubitus ulcer, stage IV
Severe protein-calorie malnutrition

## 2017-05-12 NOTE — PROGRESS NOTE ADULT - PROBLEM SELECTOR PLAN 3
- Vanc level>35, vancomycin is on hold, will rpt vanco trough in am  - zosyn IV changed to Meropenem per ID  - wound  vac dressing.   - left msg for  regarding possible debridement today and to remove the wound vac  - f/u surgery and ID  - monitor vitals and pulse oxymetry  - wound care with Collagenase   - increase Oxycontin to 10 mg q8 hrs, cont on Tylenol and morphine

## 2017-05-12 NOTE — PROGRESS NOTE ADULT - ATTENDING COMMENTS
Prognosis poor, LMVM for esla. Prognosis poor, Called and spoke to sister Fabiola and Mother Ms. Biswas Derick at length, discussed GOC and advanced directives, Mother and sister request fro DNR/DNI/Hospice care, MOLST form filled and they verbally consented for dnr/dni and requested hospice eval. Hospice consult requested, Later  pt found to be with no pulse and no breathing, patient pronounced dead at 11:12AM, family notified. Sister Fabiola and Mom Ms. biswas

## 2017-05-16 LAB
CULTURE RESULTS: SIGNIFICANT CHANGE UP
CULTURE RESULTS: SIGNIFICANT CHANGE UP
SPECIMEN SOURCE: SIGNIFICANT CHANGE UP
SPECIMEN SOURCE: SIGNIFICANT CHANGE UP

## 2017-05-19 DIAGNOSIS — L08.89 OTHER SPECIFIED LOCAL INFECTIONS OF THE SKIN AND SUBCUTANEOUS TISSUE: ICD-10-CM

## 2017-05-19 DIAGNOSIS — R64 CACHEXIA: ICD-10-CM

## 2017-05-19 DIAGNOSIS — R65.20 SEVERE SEPSIS WITHOUT SEPTIC SHOCK: ICD-10-CM

## 2017-05-19 DIAGNOSIS — J96.90 RESPIRATORY FAILURE, UNSPECIFIED, UNSPECIFIED WHETHER WITH HYPOXIA OR HYPERCAPNIA: ICD-10-CM

## 2017-05-19 DIAGNOSIS — E43 UNSPECIFIED SEVERE PROTEIN-CALORIE MALNUTRITION: ICD-10-CM

## 2017-05-19 DIAGNOSIS — M86.68 OTHER CHRONIC OSTEOMYELITIS, OTHER SITE: ICD-10-CM

## 2017-05-19 DIAGNOSIS — K59.09 OTHER CONSTIPATION: ICD-10-CM

## 2017-05-19 DIAGNOSIS — J69.0 PNEUMONITIS DUE TO INHALATION OF FOOD AND VOMIT: ICD-10-CM

## 2017-05-19 DIAGNOSIS — L89.154 PRESSURE ULCER OF SACRAL REGION, STAGE 4: ICD-10-CM

## 2017-05-19 DIAGNOSIS — I10 ESSENTIAL (PRIMARY) HYPERTENSION: ICD-10-CM

## 2017-05-19 DIAGNOSIS — I69.398 OTHER SEQUELAE OF CEREBRAL INFARCTION: ICD-10-CM

## 2017-05-19 DIAGNOSIS — Z93.0 TRACHEOSTOMY STATUS: ICD-10-CM

## 2017-05-19 DIAGNOSIS — E87.6 HYPOKALEMIA: ICD-10-CM

## 2017-05-19 DIAGNOSIS — Z66 DO NOT RESUSCITATE: ICD-10-CM

## 2017-05-19 DIAGNOSIS — R62.7 ADULT FAILURE TO THRIVE: ICD-10-CM

## 2017-05-19 DIAGNOSIS — A41.9 SEPSIS, UNSPECIFIED ORGANISM: ICD-10-CM
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
## 2017-05-19 DIAGNOSIS — Z74.01 BED CONFINEMENT STATUS: ICD-10-CM

## 2017-05-19 DIAGNOSIS — R13.13 DYSPHAGIA, PHARYNGEAL PHASE: ICD-10-CM

## 2022-07-21 NOTE — H&P ADULT. - CONSTITUTIONAL
You can apply ice temporarily until the initial pain and discomfort from a procedure is resolving.  Ibuprofen over-the-counter as directed for pain/discomfort as this will help reduce inflammation which is the primary cause of the pain.  Norco as directed for breakthrough pain not relieved by ibuprofen.  Warm compresses to the affected area for 20 minutes every 2-4 hours with gentle massage.  Change the dressing frequently while it is continuing to drain whenever to lind oils.  Keep the dressing lose so that area is able to penetrate around the edges.  Return to clinic for worsening of symptoms, onset of fever, failure for improvement in symptoms after 48 hours of antibiotic.    Clean the area well 2 to 3 times a day with antibacterial soap and water.  Allow to air dry and apply the mupirocin ointment as prescribed.  Continue to apply mupirocin ointment to keep the incision moist until it has completely healed as this will lessen the potential for scarring  
detailed exam

## 2023-02-22 NOTE — H&P ADULT. - VENOUS THROMBOEMBOLISM SCORE
Dq v-visit, pt taking oral abx for ear infection, reports neck pain/stiffness and headache. Please refer to uc for further assessment.  4

## 2023-06-07 NOTE — ED PROVIDER NOTE - PMH
Cerebrovascular accident (CVA), unspecified mechanism    Decubital ulcer    Essential hypertension    Tracheostomy in place    Uterine leiomyoma, unspecified location Saucerization Excision Additional Text (Leave Blank If You Do Not Want): The margin was drawn around the clinically apparent lesion.  Incisions were then made along these lines, in a tangential fashion, to the appropriate tissue plane and the lesion was extirpated.

## 2023-06-20 NOTE — ED ADULT NURSE NOTE - NS ED PATIENT SAFETY CONCERN
Patient recovered well post op. A&Ox4. VSS, room air. Surgical sites CDI. Surgical pain managed. Patient able to drink fluids without Nausea and vomiting. PT belongings in phase two RN. Spouse updated and discussed plan of care. Report called to Phase two RN.      No

## 2024-02-19 NOTE — DISCHARGE NOTE ADULT - PROVIDER RX CONTACT NUMBER
Physical Therapy      Patient Name:  Nelly Tian   MRN:  [id number]    Patient not seen today secondary to Patient unwilling to participate, Patient fatigue (patient refused PT due to exhaustion having spent all last night in the ED). Will follow-up 2/20/21.    
[phone]
